# Patient Record
Sex: MALE | Race: OTHER
[De-identification: names, ages, dates, MRNs, and addresses within clinical notes are randomized per-mention and may not be internally consistent; named-entity substitution may affect disease eponyms.]

---

## 2019-09-27 ENCOUNTER — HOSPITAL ENCOUNTER (EMERGENCY)
Dept: HOSPITAL 61 - ER | Age: 49
Discharge: HOME | End: 2019-09-27
Payer: SELF-PAY

## 2019-09-27 VITALS — DIASTOLIC BLOOD PRESSURE: 90 MMHG | SYSTOLIC BLOOD PRESSURE: 149 MMHG

## 2019-09-27 VITALS — HEIGHT: 67 IN | WEIGHT: 200 LBS | BODY MASS INDEX: 31.39 KG/M2
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
## 2019-09-27 DIAGNOSIS — S60.221A: Primary | ICD-10-CM
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
## 2019-09-27 PROCEDURE — 90715 TDAP VACCINE 7 YRS/> IM: CPT

## 2019-09-27 PROCEDURE — 99284 EMERGENCY DEPT VISIT MOD MDM: CPT

## 2019-09-27 PROCEDURE — 90471 IMMUNIZATION ADMIN: CPT

## 2019-09-27 PROCEDURE — 73130 X-RAY EXAM OF HAND: CPT

## 2019-09-27 NOTE — PHYS DOC
Past Medical History


Past Medical History:  No Pertinent History


Past Surgical History:  No Surgical History


Alcohol Use:  None


Drug Use:  None





Adult General


Chief Complaint


Chief Complaint:  PUNCTURE WOUND





[facility]


HPI





Patient is a 48  year old right handed male who presents with complaining of 

injury to right hand. Patient states he was hit with a piece of wood that had an

old  nail and had a puncture wound to dorsal and proximal part of his hand both 

5 hours ago with increasing pain and edema. Patient rated his pain 10 over 10. 

Patient denies fever and chills, focal neuro deficit, other injuries. Last 

tetanus immunization is unknown.





Review of Systems


Review of Systems





Constitutional: Denies fever or chills []


Eyes: Denies change in visual acuity, redness, or eye pain []


HENT: Denies nasal congestion or sore throat []


Respiratory: Denies cough or shortness of breath []


Cardiovascular: No additional information not addressed in HPI []


GI: Denies abdominal pain, nausea, vomiting, bloody stools or diarrhea []


: Denies dysuria or hematuria []


Musculoskeletal: Denies back pain, reports joint pain []


Integument: Denies rash or skin lesions []


Neurologic: Denies headache, focal weakness or sensory changes []


Endocrine: Denies polyuria or polydipsia []





All other systems were reviewed and found to be within normal limits, except as 

documented in this note.





Current Medications


Current Medications





Current Medications








 Medications


  (Trade)  Dose


 Ordered  Sig/[facility]  Start Time


 Stop Time Status Last Admin


Dose Admin


 


 Acetaminophen/


 Hydrocodone Bitart


  (Lortab 5/325)  1 tab  1X  ONCE  19 22:00


 19 22:01 DC 19 21:58


1 TAB


 


 Ciprofloxacin


  (Cipro)  250 mg  1X  ONCE  19 22:00


 19 22:01 DC 19 21:56


250 MG


 


 Diphtheria/


 Tetanus/[facility]


 Pertussis


  (Boostrix)  0.5 ml  ONCE ONCE  19 22:00


 19 22:01 DC 19 22:09


0.5 ML











Allergies


Allergies





Allergies








Coded Allergies Type Severity Reaction Last Updated Verified


 


  No Known Drug Allergies    18 No











Physical Exam


Physical Exam








Constitutional: Well developed, well nourished, mild distress, non-toxic 

appearance. []


HENT: Normocephalic, atraumatic.


Eyes: PERRLA, EOMI, conjunctiva normal, no discharge. [] 


Neck: Normal range of motion, no tenderness, supple, no stridor. [] 


Cardiovascular:Heart rate regular rhythm, no murmur []


Lungs & Thorax:  Bilateral breath sounds clear to auscultation []


Extremities: Right hand with a puncture wound in proximal of fourth metacarpal 

with moderate edema and tenderness without sign of infection, painful range of 

motion.


Neurologic: Alert and oriented X 3, no focal deficits noted. []


Psychologic: Affect anxious, judgement normal, mood normal. []





Current Patient Data


Vital Signs





                                   Vital Signs








  Date Time  Temp Pulse Resp B/P (MAP) Pulse Ox O2 Delivery O2 Flow Rate FiO2


 


19 21:58   20  98 Room Air  


 


19 21:05 98.4 92  149/90 (109)    





 98.4       











EKG


EKG


[]





Radiology/Procedures


Radiology/Procedures


[][facility]


                    [address] [place] [address]


                                 [phone]


                                        


                                 IMAGING REPORT





                                     Signed





PATIENT: LLOYD MCCRACKEN  ACCOUNT: [id number]     MRN#: [id number]


: 1970           LOCATION: ER              AGE: 48


SEX: M                    EXAM DT: 19         ACCESSION#: [id number]


STATUS: REG ER            ORD. PHYSICIAN: HODAN TIRADO MD


REASON: puncture wound with a nail


PROCEDURE: HAND RIGHT 3V





Indication:Puncture wound with nail.


 


TECHNIQUE: 3 views of right hand


 


COMPARISON: None


 


FINDINGS/


impression: No acute fracture or dislocation. No radiopaque foreign body.


 


Electronically signed by: Ilya Hunt DO (2019 9:40 PM) [facility]














DICTATED and SIGNED BY:     ILYA HUNT DO


DATE:     19





Course & Med Decision Making


Course & Med Decision Making


Pertinent  Imaging studies reviewed. (See chart for details)











I've spoken with the patient and/or caregivers. I've explained the patient's 

condition, diagnosis and treatment plan based on information available to me at 

this time. I've answered the patient's and/or caregivers questions and addressed

 any concerns. The patient and/or caregivers have a good understanding the 

patient's diagnosis, condition and treatment plan as can be expected at this 

point. Vital signs have been stabilized. The patient's condition is stable for 

discharge from the emergency department.





The patient will pursue further outpatient evaluation with her primary care 

provider or other designated consulting physician as outlined in the discharge 

instructions. Patient and/or caregivers are agreeable to this plan of care and 

follow-up instructions have been explained in detail. The patient and/or 

caregivers have received these instructions in written format and expressed 

understanding of these discharge instructions. The patient and her caregivers 

are aware that if any significant change in condition or worsening of symptoms 

should prompt him to immediately return to this of the closest emergency de

partment.  If an emergent department is not readily available I would encourage 

him to call [phone].





Dragon Disclaimer


Dragon Disclaimer


This electronic medical record was generated, in whole or in part, using a voice

 recognition dictation system.





Departure


Departure


Impression:  


   Primary Impression:  


   Puncture wound of right hand


   Additional Impression:  


   Contusion of hand


Disposition:   HOME, SELF-CARE (at 2256)


Condition:  IMPROVED


Referrals:  


NO PCP (PCP)


Patient Instructions:  Hand Contusion, Puncture Wound





Additional Instructions:  


Apply ice on your hand


Follow-up with your primary care physician in 3-5 days


Return to ER if not getting better


Scripts


Hydrocodone/Apap 5-325 (NORCO 5-325 TABLET) 1 Each Tablet


1 TAB PO PRN Q6HRS PRN for PAIN, #10 TAB 0 Refills


   Prov: HODAN TIRADO MD         19 


Ciprofloxacin Hcl (CIPRO) 250 Mg Tablet


1 TAB PO BID for infection, #14 TAB


   Prov: HODAN TIRADO MD         19





Problem Qualifiers








   Primary Impression:  


   Puncture wound of right hand


   Encounter type:  initial encounter  Foreign body presence:  without foreign 

   body  Qualified Codes:  S61.431A - Puncture wound without foreign body of 

   right hand, initial encounter


   Additional Impression:  


   Contusion of hand


   Encounter type:  subsequent encounter  Laterality:  right  Qualified Codes:  

   S60.221D - Contusion of right hand, subsequent encounter








HODAN TIRADO MD             Sep 27, 2019 21:27

## 2019-09-27 NOTE — RAD
Indication:Puncture wound with nail.

 

TECHNIQUE: 3 views of right hand

 

COMPARISON: None

 

FINDINGS/

impression: No acute fracture or dislocation. No radiopaque foreign body.

 

Electronically signed by: Ilya Israel DO (9/27/2019 9:40 PM) [facility]

## 2019-09-29 ENCOUNTER — HOSPITAL ENCOUNTER (INPATIENT)
Dept: HOSPITAL 61 - ER | Age: 49
LOS: 4 days | Discharge: HOME | DRG: 982 | End: 2019-10-03
Attending: INTERNAL MEDICINE | Admitting: INTERNAL MEDICINE
Payer: SELF-PAY

## 2019-09-29 VITALS — DIASTOLIC BLOOD PRESSURE: 56 MMHG | SYSTOLIC BLOOD PRESSURE: 97 MMHG

## 2019-09-29 VITALS — SYSTOLIC BLOOD PRESSURE: 118 MMHG | DIASTOLIC BLOOD PRESSURE: 75 MMHG

## 2019-09-29 VITALS — BODY MASS INDEX: 31.13 KG/M2 | WEIGHT: 198.37 LBS | HEIGHT: 67 IN

## 2019-09-29 VITALS — SYSTOLIC BLOOD PRESSURE: 118 MMHG | DIASTOLIC BLOOD PRESSURE: 65 MMHG

## 2019-09-29 VITALS — DIASTOLIC BLOOD PRESSURE: 63 MMHG | SYSTOLIC BLOOD PRESSURE: 99 MMHG

## 2019-09-29 VITALS — DIASTOLIC BLOOD PRESSURE: 74 MMHG | SYSTOLIC BLOOD PRESSURE: 118 MMHG

## 2019-09-29 VITALS — SYSTOLIC BLOOD PRESSURE: 119 MMHG | DIASTOLIC BLOOD PRESSURE: 76 MMHG

## 2019-09-29 VITALS — SYSTOLIC BLOOD PRESSURE: 124 MMHG | DIASTOLIC BLOOD PRESSURE: 74 MMHG

## 2019-09-29 VITALS — DIASTOLIC BLOOD PRESSURE: 76 MMHG | SYSTOLIC BLOOD PRESSURE: 122 MMHG

## 2019-09-29 VITALS — DIASTOLIC BLOOD PRESSURE: 71 MMHG | SYSTOLIC BLOOD PRESSURE: 119 MMHG

## 2019-09-29 DIAGNOSIS — S61.531A: Primary | ICD-10-CM

## 2019-09-29 DIAGNOSIS — L02.511: ICD-10-CM

## 2019-09-29 DIAGNOSIS — R73.03: ICD-10-CM
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
## 2019-09-29 DIAGNOSIS — E66.9: ICD-10-CM

## 2019-09-29 DIAGNOSIS — M00.9: ICD-10-CM
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
## 2019-09-29 DIAGNOSIS — D64.9: ICD-10-CM
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
## 2019-09-29 DIAGNOSIS — Z83.3: ICD-10-CM

## 2019-09-29 DIAGNOSIS — L03.113: ICD-10-CM

## 2019-09-29 DIAGNOSIS — E78.5: ICD-10-CM
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
## 2019-09-29 LAB
ALBUMIN SERPL-MCNC: 3.9 G/DL (ref 3.4–5)
ALBUMIN/GLOB SERPL: 0.9 {RATIO} (ref 1–1.7)
ALP SERPL-CCNC: 51 U/L (ref 46–116)
ALT SERPL-CCNC: 53 U/L (ref 16–63)
ANION GAP SERPL CALC-SCNC: 3 MMOL/L (ref 6–14)
AST SERPL-CCNC: 27 U/L (ref 15–37)
BASOPHILS # BLD AUTO: 0 X10^3/UL (ref 0–0.2)
BASOPHILS NFR BLD: 1 % (ref 0–3)
BF MON %: 24 %
BF OTHER %: 6 %
BF PMN %: 70 %
BF RBC COUNT: (no result) /CMM
BF SOURCE: (no result)
BF WBC COUNT: 6175 /CMM
BILIRUB SERPL-MCNC: 0.7 MG/DL (ref 0.2–1)
BUN SERPL-MCNC: 15 MG/DL (ref 8–26)
BUN/CREAT SERPL: 14 (ref 6–20)
CALCIUM SERPL-MCNC: 9.6 MG/DL (ref 8.5–10.1)
CHLORIDE SERPL-SCNC: 105 MMOL/L (ref 98–107)
CLARITY SPEC: (no result)
CO2 SERPL-SCNC: 31 MMOL/L (ref 21–32)
COLOR FLD: (no result)
CREAT SERPL-MCNC: 1.1 MG/DL (ref 0.7–1.3)
EOSINOPHIL NFR BLD: 0 % (ref 0–3)
EOSINOPHIL NFR BLD: 0 X10^3/UL (ref 0–0.7)
ERYTHROCYTE [DISTWIDTH] IN BLOOD BY AUTOMATED COUNT: 13.5 % (ref 11.5–14.5)
GFR SERPLBLD BASED ON 1.73 SQ M-ARVRAT: 72.1 ML/MIN
GLOBULIN SER-MCNC: 4.2 G/DL (ref 2.2–3.8)
GLUCOSE SERPL-MCNC: 104 MG/DL (ref 70–99)
HCT VFR BLD CALC: 41.5 % (ref 39–53)
HGB BLD-MCNC: 14.4 G/DL (ref 13–17.5)
LYMPHOCYTES # BLD: 2.1 X10^3/UL (ref 1–4.8)
LYMPHOCYTES NFR BLD AUTO: 21 % (ref 24–48)
MCH RBC QN AUTO: 31 PG (ref 25–35)
MCHC RBC AUTO-ENTMCNC: 35 G/DL (ref 31–37)
MCV RBC AUTO: 88 FL (ref 79–100)
MONO #: 1 X10^3/UL (ref 0–1.1)
MONOCYTES NFR BLD: 10 % (ref 0–9)
NEUT #: 6.7 X10^3/UL (ref 1.8–7.7)
NEUTROPHILS NFR BLD AUTO: 68 % (ref 31–73)
PLATELET # BLD AUTO: 231 X10^3/UL (ref 140–400)
POTASSIUM SERPL-SCNC: 4 MMOL/L (ref 3.5–5.1)
PROT SERPL-MCNC: 8.1 G/DL (ref 6.4–8.2)
RBC # BLD AUTO: 4.73 X10^6/UL (ref 4.3–5.7)
SODIUM SERPL-SCNC: 139 MMOL/L (ref 136–145)
WBC # BLD AUTO: 9.8 X10^3/UL (ref 4–11)

## 2019-09-29 PROCEDURE — 96374 THER/PROPH/DIAG INJ IV PUSH: CPT

## 2019-09-29 PROCEDURE — 80202 ASSAY OF VANCOMYCIN: CPT

## 2019-09-29 PROCEDURE — 85025 COMPLETE CBC W/AUTO DIFF WBC: CPT

## 2019-09-29 PROCEDURE — 82565 ASSAY OF CREATININE: CPT

## 2019-09-29 PROCEDURE — A7015 AEROSOL MASK USED W NEBULIZE: HCPCS

## 2019-09-29 PROCEDURE — G0378 HOSPITAL OBSERVATION PER HR: HCPCS

## 2019-09-29 PROCEDURE — 89050 BODY FLUID CELL COUNT: CPT

## 2019-09-29 PROCEDURE — 87040 BLOOD CULTURE FOR BACTERIA: CPT

## 2019-09-29 PROCEDURE — 85027 COMPLETE CBC AUTOMATED: CPT

## 2019-09-29 PROCEDURE — 80048 BASIC METABOLIC PNL TOTAL CA: CPT

## 2019-09-29 PROCEDURE — 96376 TX/PRO/DX INJ SAME DRUG ADON: CPT

## 2019-09-29 PROCEDURE — 87071 CULTURE AEROBIC QUANT OTHER: CPT

## 2019-09-29 PROCEDURE — 36415 COLL VENOUS BLD VENIPUNCTURE: CPT

## 2019-09-29 PROCEDURE — 96365 THER/PROPH/DIAG IV INF INIT: CPT

## 2019-09-29 PROCEDURE — 51702 INSERT TEMP BLADDER CATH: CPT

## 2019-09-29 PROCEDURE — 85651 RBC SED RATE NONAUTOMATED: CPT

## 2019-09-29 PROCEDURE — 0MD70ZZ EXTRACTION OF RIGHT HAND BURSA AND LIGAMENT, OPEN APPROACH: ICD-10-PCS | Performed by: ORTHOPAEDIC SURGERY

## 2019-09-29 PROCEDURE — 87075 CULTR BACTERIA EXCEPT BLOOD: CPT

## 2019-09-29 PROCEDURE — 96375 TX/PRO/DX INJ NEW DRUG ADDON: CPT

## 2019-09-29 PROCEDURE — 85610 PROTHROMBIN TIME: CPT

## 2019-09-29 PROCEDURE — 96366 THER/PROPH/DIAG IV INF ADDON: CPT

## 2019-09-29 PROCEDURE — 80053 COMPREHEN METABOLIC PANEL: CPT

## 2019-09-29 PROCEDURE — 0R9N3ZZ DRAINAGE OF RIGHT WRIST JOINT, PERCUTANEOUS APPROACH: ICD-10-PCS | Performed by: ORTHOPAEDIC SURGERY

## 2019-09-29 RX ADMIN — PIPERACILLIN SODIUM AND TAZOBACTAM SODIUM SCH MLS/HR: 3; .375 INJECTION, POWDER, LYOPHILIZED, FOR SOLUTION INTRAVENOUS at 19:15

## 2019-09-29 RX ADMIN — VANCOMYCIN HYDROCHLORIDE PRN EACH: 1 INJECTION, POWDER, LYOPHILIZED, FOR SOLUTION INTRAVENOUS at 14:29

## 2019-09-29 RX ADMIN — FENTANYL CITRATE PRN MCG: 50 INJECTION INTRAMUSCULAR; INTRAVENOUS at 07:56

## 2019-09-29 RX ADMIN — FENTANYL CITRATE PRN MCG: 50 INJECTION INTRAMUSCULAR; INTRAVENOUS at 08:28

## 2019-09-29 RX ADMIN — HYDROCODONE BITARTRATE AND ACETAMINOPHEN PRN TAB: 5; 325 TABLET ORAL at 14:02

## 2019-09-29 RX ADMIN — VANCOMYCIN HYDROCHLORIDE SCH MLS/HR: 1 INJECTION, POWDER, FOR SOLUTION INTRAVENOUS at 23:07

## 2019-09-29 RX ADMIN — PIPERACILLIN SODIUM AND TAZOBACTAM SODIUM SCH MLS/HR: 3; .375 INJECTION, POWDER, LYOPHILIZED, FOR SOLUTION INTRAVENOUS at 14:02

## 2019-09-29 RX ADMIN — FENTANYL CITRATE PRN MCG: 50 INJECTION INTRAMUSCULAR; INTRAVENOUS at 10:15

## 2019-09-29 NOTE — CONS
DATE OF CONSULTATION:  09/29/2019



REFERRING PHYSICIAN:  Shabnam Archer MD



REASON FOR CONSULTATION:  Severe cellulitis.



HISTORY OF PRESENT ILLNESS:  This patient is a 48-year-old  male who on

09/27 was working at home when a wooden board, with a nail sticking out, fell,

puncturing his right hand.  He says he wrapped his hand to stop the bleeding.  A

few hours later, he arrived to the ER with complaints of pain and swelling. 

X-ray at the time showed no acute fracture, dislocation or radiopaque foreign

body.  He was given a tetanus shot, prescription for Cipro and released home. 

Over the following 2 days, he says his hand became increasingly painful, swollen

with limited range of motion despite antibiotics.  He is now on vancomycin.  He

was evaluated by Ortho with plans for surgery soon.



PAST MEDICAL HISTORY:  Hyperlipidemia and prediabetes.



PAST SURGICAL HISTORY:  Denies previous surgeries.



FAMILY HISTORY:  Diabetes.



SOCIAL HISTORY:  He is .  He works as a .  He does not

smoke.



ALLERGIES:  No known drug allergies.



MEDICATIONS:  Vancomycin, clonidine, fentanyl, Lortab, Dilaudid, morphine,

ondansetron, prochlorperazine, and Tylenol.  Previously on outpatient

ciprofloxacin.



REVIEW OF SYSTEMS:  The patient denies fevers, chills, sweats or body aches. 

Denies shortness of air, cough or chest discomfort.  Denies nausea, vomiting or

diarrhea.  Denies rash.  Other review of systems negative.



PHYSICAL EXAMINATION:

VITAL SIGNS:  Temperature is 97.9, blood pressure 138/87, heart rate 85,

respiratory rate 16, and pulse oximetry 98% on room air.  BMI is 31.

GENERAL:  The patient is sitting in a chair, alert and well appearing.

HEENT:  Pupils are equally round and reactive.  Oropharynx pink and moist.

NECK:  Supple.

LUNGS:  Clear to auscultation.

CARDIAC:  S1 and S2.

ABDOMEN:  Soft and nontender.

EXTREMITIES:  Unremarkable except right hand and fingers are edematous and warm

with limited .  He has a puncture tunde dorsal aspect of the wrist area. 

Radial pulse is strong and capillary reflex brisk.

SKIN:  Warm to touch.  No signs of rash.

NEUROLOGIC:  Alert, answering questions appropriately.



LABORATORY DATA:  Today's WBC 9.8, hemoglobin 14.4, and platelets 231,000.  Sed

rate 23.  Creatinine 1.1 and BUN 15.  Electrolytes are unremarkable.  Glucose

104.  Total bilirubin 0.7, AST 27, and ALT 53.  X-ray of the hand per HPI.



IMPRESSION:

1.  Cellulitis of right hand.

2.  Puncture wound, right hand, with nail.

3.  Prediabetes.

4.  Hyperlipidemia.



PLAN:  The patient already was given a tetanus shot on the 27th.  Continue the

vancomycin and add Zosyn.  Monitor renal function closely.  He was seen by Ortho

with plans for surgery today.  Cultures requested.  Discussed with wife at

bedside.



Thank you Dr. Archer for asking us to participate in this patient's care. 

Should you have further questions or concerns, please call.

 



______________________________

YEVGENIY DALLAS MD



DR:  BOOM/nts  JOB#:  [id number] / [id number]

DD:  09/29/2019 11:13  DT:  09/29/2019 15:46

## 2019-09-29 NOTE — CONS
DATE OF CONSULTATION:  09/29/2019



REASON FOR CONSULTATION:  Right wrist pain and swelling.



REQUESTING PHYSICIAN:  Dr. Rocha and Dr. Archer.



HISTORY OF PRESENT ILLNESS:  The patient is a 48-year-old male who speaks

reasonable English and complains of right wrist pain and swelling, particularly

over the back of his hand.  After he had an injury from a nail several days ago

that poked into the dorsal aspect of his hand over the roughly over the wrist

joint.  He said it initially bled quite a bit and he placed a dressing over it

and wrapped that, he came to the Emergency Department later apparently was

provided a tetanus and some antibiotics and pain medication, but he notes that

despite taking antibiotics swelling and pain have worsened and it is more red,

warm and very painful, difficult to move his wrist and fully flex his fingers

due to the swelling.



PAST MEDICAL HISTORY/SURGICAL HISTORY:  He denies any past medical history or

surgical history.



SOCIAL HISTORY:  No tobacco, alcohol or drug use.  He is accompanied by his

family.  He has been taking Tylenol for pain.



ALLERGIES:  He has no known drug allergies.



REVIEW OF SYSTEMS:  Denies any fever, chills, chest pain or shortness of breath.

 No additional joint pain aside from the hand and wrist swelling on the right

and tightness where he can flex the hand very well.  Denies any other skin

problems, joint pain, or other constitutional symptoms.  No chest pain,

shortness of breath, respiratory or urinary tract symptoms.



PHYSICAL EXAMINATION:

GENERAL:  Pleasant, cooperative male.

VITAL SIGNS:  Temperature 97.9, pulse 83, respirations 16, blood pressure 153/98

and 98% saturation on room air.

EXTREMITIES:  On examination of the right hand, he has significant swelling over

the dorsum of the hand.  No specific point tenderness over the extensor

mechanism, although his wrist does have an effusion and is very tense.  He

cannot flex his fingers fully.  Flexor profundus superficialis function;

however, is intact and he has no tenderness on palpation over the flexor tendon

sheaths.



LABORATORY DATA:  White count is 9.8.



IMAGING DATA:  X-rays show no bony abnormality.  He has normal examination

contralateral hand and wrist, bilateral shoulder and elbow as well.



IMPRESSION:  Right wrist puncture wound and possible septic arthritis, right

wrist.



TREATMENT PLAN:  I went over with him my rationale for aspirating the wrist. 

His wrist was aspirated for about 3-4 mL of very cloudy appearing blood-tinged

fluid.  This was sent off for cell count, aerobic and anaerobic cultures and

Gram stain.  I told him that based on his symptoms.  He did not really have an

infected wrist joint and I recommend surgical treatment of that because of the

rapid damage that can occur with joint involvement.  All his questions were

answered and he does wish to proceed with surgical evaluation and treatment,

which will occur urgently today as he has had nothing to eat or drink since last

night.

 



______________________________

SHATNI DUMAS MD



DR:  YURIDIA/oscar  JOB#:  [id number] / [id number]

DD:  09/29/2019 11:24  DT:  09/29/2019 15:32

## 2019-09-29 NOTE — PDOC1
History and Physical


Date of Admission


Date of Admission


DATE: 9/29/19 


TIME: 09:34





Identification/Chief Complaint


Chief Complaint


PUNCTURED WOUND BY NAIL rt haND 2 DAYS AGO





Source


Source:  Caregiver, Chart review, Patient





History of Present Illness


History of Present Illness


48  male, accompanied by wife, he understands and speaks a little 

english, works in construction, punctured his rt hand with a nail last 

friday,went to our ER, XRAY was ok and sent home on PO cipto and pain med to 

come back today, MARKEDLY SWOLLEN RT HAND to RT ARM, afebrile though, cant make 

a fist, no leukocytosis,  esr i ordered but still pending,. Admitted for failed 

OP abx,


NON DM, does not take any home meds pTA. NOn smoker, nonm drinker, no prev sxs. 

Got vanc at ER





Past Medical History


Cardiovascular:  No pertinent hx


Pulmonary:  No pertinent hx


GI:  No pertinent hx


Heme/Onc:  No pertinent hx


Hepatobiliary:  No pertinent hx


Psych:  No pertinent hx


Rheumatologic:  No pertinent hx


Infectious disease:  No pertinent hx


ENT:  No pertinent hx


Endocrine:  No pertinent hx


Dermatology:  No pertinent hx





Past Surgical History


Past Surgical History:  No pertinent history





Family History


Family History:  No Significant





Social History


Smoke:  No


ALCOHOL:  none


Drugs:  None





Current Problem List


Problem List


Problems


Medical Problems:


(1) Cellulitis of right hand


Status: Acute  











Current Medications


Current Medications





Current Medications


Fentanyl Citrate (Fentanyl 2ml Vial) 25 mcg PRN Q15MIN  PRN IV PAIN GREATER THAN

3/10 Last administered on 9/29/19at 08:28;  Start 9/29/19 at 07:15;  Stop 

9/30/19 at 07:14


Vancomycin HCl 250 ml @  250 mls/hr 1X  ONCE IV  Last administered on 9/29/19at 

08:00;  Start 9/29/19 at 07:15;  Stop 9/29/19 at 08:14;  Status DC


Acetaminophen/ Hydrocodone Bitart (Lortab 5/325) 1 tab PRN Q6HRS  PRN PO PAIN;  

Start 9/29/19 at 08:45


Morphine Sulfate (Morphine Sulfate) 2 mg PRN Q2HR  PRN IV PAIN;  Start 9/29/19 

at 08:45


Acetaminophen (Tylenol) 500 mg PRN Q6HRS  PRN PO MILD PAIN / TEMP;  Start 

9/29/19 at 08:45


Ondansetron HCl (Zofran) 4 mg PRN Q6HRS  PRN IVP NAUSEA/VOMITING;  Start 9/29/19

at 08:45


Vancomycin HCl (Vanco Per Pharmacy) 1 each PRN DAILY  PRN MC SEE COMMENTS;  

Start 9/29/19 at 08:45;  Status UNV


Clonidine HCl (Catapres) 0.1 mg PRN Q1HR  PRN PO HYPERTENSION;  Start 9/29/19 at

08:45


Vancomycin HCl 250 ml @  250 mls/hr 1X  ONCE IV ;  Start 9/29/19 at 09:00;  Stop

9/29/19 at 09:59


Ondansetron HCl (Zofran) 4 mg PRN Q8HRS  PRN IV NAUSEA/VOMITING;  Start 9/29/19 

at 09:00;  Stop 9/30/19 at 08:59


Morphine Sulfate (Morphine Sulfate) 4 mg PRN Q2HR  PRN IV PAIN;  Start 9/29/19 

at 09:00;  Stop 9/30/19 at 08:59


Acetaminophen (Tylenol) 650 mg PRN Q4HRS  PRN PO FEVER;  Start 9/29/19 at 09:00;

 Stop 9/30/19 at 08:59





Active Scripts


Active


Norco 5-325 Tablet (Acetaminophen/Hydrocodone Bitart) 1 Each Tablet 1 Tab PO PRN

Q6HRS PRN


Cipro (Ciprofloxacin Hcl) 250 Mg Tablet 1 Tab PO BID


Azithromycin Tablet (Azithromycin) 250 Mg Tablet 1 Pkg PO UD





Allergies


Allergies:  


Coded Allergies:  


     No Known Drug Allergies (Unverified , 12/28/18)





ROS


Review of System


as per HPI, the rest 14 pt neg





Physical Exam


General:  Alert, Oriented X3, Cooperative, No acute distress


HEENT:  Atraumatic, PERRLA


Lungs:  Clear to auscultation, Normal air movement


Heart:  S1S2, RRR, no thrills, no rubs


Cardiovascular:  S1, S2


Breasts:  Normal, Rt breast nml w/o mass, Lt breast nml w/o mass, Nipples normal


Abdomen:  Normal bowel sounds, Soft, No tenderness, No hepatosplenomegaly, No 

masses


Male Genitals Exam:  normal genitalia, normal prostate


PELVIC:  Nml ext genitalia


Extremities:  Other (RT hand markedly swoelln, cant nake a fist, tenderness and 

warm up to rt forarm near elbow, punctired dorsal site hand wound visible, no 

bleeding today)


Neuro:  Normal gait, Normal speech, Strength at 5/5 X4 ext, Normal tone, 

Sensation intact, Cranial nerves 3-12 NL, Reflexes 2+


Psych/Mental Status:  Mental status NL, Mood NL





Vitals


Vitals





Vital Signs








  Date Time  Temp Pulse Resp B/P (MAP) Pulse Ox O2 Delivery O2 Flow Rate FiO2


 


9/29/19 08:28   16     


 


9/29/19 07:05 97.9 83  153/98 (116) 98 Room Air  





 97.9       











Labs


Labs





Laboratory Tests








Test


 9/29/19


07:37


 


White Blood Count


 9.8 x10^3/uL


(4.0-11.0)


 


Red Blood Count


 4.73 x10^6/uL


(4.30-5.70)


 


Hemoglobin


 14.4 g/dL


(13.0-17.5)


 


Hematocrit


 41.5 %


(39.0-53.0)


 


Mean Corpuscular Volume 88 fL () 


 


Mean Corpuscular Hemoglobin 31 pg (25-35) 


 


Mean Corpuscular Hemoglobin


Concent 35 g/dL


(31-37)


 


Red Cell Distribution Width


 13.5 %


(11.5-14.5)


 


Platelet Count


 231 x10^3/uL


(140-400)


 


Neutrophils (%) (Auto) 68 % (31-73) 


 


Lymphocytes (%) (Auto) 21 % (24-48) 


 


Monocytes (%) (Auto) 10 % (0-9) 


 


Eosinophils (%) (Auto) 0 % (0-3) 


 


Basophils (%) (Auto) 1 % (0-3) 


 


Neutrophils # (Auto)


 6.7 x10^3/uL


(1.8-7.7)


 


Lymphocytes # (Auto)


 2.1 x10^3/uL


(1.0-4.8)


 


Monocytes # (Auto)


 1.0 x10^3/uL


(0.0-1.1)


 


Eosinophils # (Auto)


 0.0 x10^3/uL


(0.0-0.7)


 


Basophils # (Auto)


 0.0 x10^3/uL


(0.0-0.2)


 


Sodium Level


 139 mmol/L


(136-145)


 


Potassium Level


 4.0 mmol/L


(3.5-5.1)


 


Chloride Level


 105 mmol/L


()


 


Carbon Dioxide Level


 31 mmol/L


(21-32)


 


Anion Gap 3 (6-14) 


 


Blood Urea Nitrogen


 15 mg/dL


(8-26)


 


Creatinine


 1.1 mg/dL


(0.7-1.3)


 


Estimated GFR


(Cockcroft-Gault) 72.1 





 


BUN/Creatinine Ratio 14 (6-20) 


 


Glucose Level


 104 mg/dL


(70-99)


 


Calcium Level


 9.6 mg/dL


(8.5-10.1)


 


Total Bilirubin


 0.7 mg/dL


(0.2-1.0)


 


Aspartate Amino Transf


(AST/SGOT) 27 U/L (15-37) 





 


Alanine Aminotransferase


(ALT/SGPT) 53 U/L (16-63) 





 


Alkaline Phosphatase


 51 U/L


()


 


Total Protein


 8.1 g/dL


(6.4-8.2)


 


Albumin


 3.9 g/dL


(3.4-5.0)


 


Albumin/Globulin Ratio 0.9 (1.0-1.7) 








Laboratory Tests








Test


 9/29/19


07:37


 


White Blood Count


 9.8 x10^3/uL


(4.0-11.0)


 


Red Blood Count


 4.73 x10^6/uL


(4.30-5.70)


 


Hemoglobin


 14.4 g/dL


(13.0-17.5)


 


Hematocrit


 41.5 %


(39.0-53.0)


 


Mean Corpuscular Volume 88 fL () 


 


Mean Corpuscular Hemoglobin 31 pg (25-35) 


 


Mean Corpuscular Hemoglobin


Concent 35 g/dL


(31-37)


 


Red Cell Distribution Width


 13.5 %


(11.5-14.5)


 


Platelet Count


 231 x10^3/uL


(140-400)


 


Neutrophils (%) (Auto) 68 % (31-73) 


 


Lymphocytes (%) (Auto) 21 % (24-48) 


 


Monocytes (%) (Auto) 10 % (0-9) 


 


Eosinophils (%) (Auto) 0 % (0-3) 


 


Basophils (%) (Auto) 1 % (0-3) 


 


Neutrophils # (Auto)


 6.7 x10^3/uL


(1.8-7.7)


 


Lymphocytes # (Auto)


 2.1 x10^3/uL


(1.0-4.8)


 


Monocytes # (Auto)


 1.0 x10^3/uL


(0.0-1.1)


 


Eosinophils # (Auto)


 0.0 x10^3/uL


(0.0-0.7)


 


Basophils # (Auto)


 0.0 x10^3/uL


(0.0-0.2)


 


Sodium Level


 139 mmol/L


(136-145)


 


Potassium Level


 4.0 mmol/L


(3.5-5.1)


 


Chloride Level


 105 mmol/L


()


 


Carbon Dioxide Level


 31 mmol/L


(21-32)


 


Anion Gap 3 (6-14) 


 


Blood Urea Nitrogen


 15 mg/dL


(8-26)


 


Creatinine


 1.1 mg/dL


(0.7-1.3)


 


Estimated GFR


(Cockcroft-Gault) 72.1 





 


BUN/Creatinine Ratio 14 (6-20) 


 


Glucose Level


 104 mg/dL


(70-99)


 


Calcium Level


 9.6 mg/dL


(8.5-10.1)


 


Total Bilirubin


 0.7 mg/dL


(0.2-1.0)


 


Aspartate Amino Transf


(AST/SGOT) 27 U/L (15-37) 





 


Alanine Aminotransferase


(ALT/SGPT) 53 U/L (16-63) 





 


Alkaline Phosphatase


 51 U/L


()


 


Total Protein


 8.1 g/dL


(6.4-8.2)


 


Albumin


 3.9 g/dL


(3.4-5.0)


 


Albumin/Globulin Ratio 0.9 (1.0-1.7) 











VTE Prophylaxis Ordered


VTE Prophylaxis Devices:  Yes


VTE Pharmacological Prophylaxi:  Yes





Assessment/Plan


Assessment/Plan


PUNCTURED wound by nail - 9/27 - got TT, failed oP cipro


NON DM


Cellulitis, MARKED RT arm up to RT forearm, tight - r/o compartment





PLAN:


NPO till ortho


CHeck CT arm


add esr


NO  home meds tor reconcile


I cont vanc per pharmacy


Consult ID


FULL CODE


Seen at ER


dw wife and pt and DEE Buchanan MD           Sep 29, 2019 09:39

## 2019-09-29 NOTE — PHYS DOC
Past Medical History


Past Medical History:  No Pertinent History


Past Surgical History:  No Surgical History


Alcohol Use:  None


Drug Use:  None





Adult General


Chief Complaint


Chief Complaint:  HAND PROBLEM





HPI


HPI





Jose is a 48-year-old male who presents with complaint of right hand pain and 

swelling after an nail pierced the dorsal aspect of his hand. He states that it 

had been bleeding quite a bit with the initial injury and he placed a dressing 

over it and wrapped it. He states that he then came here and was seen by a 

provider and was prescribed antibiotics and pain medication. He states that 

despite taking the antibiotics, swelling and pain have worsened as well as 

redness and warmth to the area. Patient rates the pain as moderate and states 

that pain is worsened with flexion of his fingers. He states that pain is 

primarily in the dorsal aspect of his hand.[]





Review of Systems


Review of Systems





Constitutional: Denies fever or chills []


Respiratory: Denies cough or shortness of breath []


Cardiovascular: No additional information not addressed in HPI []


Musculoskeletal: Positive right hand pain []


Integument: Denies rash or skin lesions []





All other systems were reviewed and found to be within normal limits, except as 

documented in this note.





Current Medications


Current Medications





Current Medications








 Medications


  (Trade)  Dose


 Ordered  Sig/Alton  Start Time


 Stop Time Status Last Admin


Dose Admin


 


 Acetaminophen


  (Tylenol)  500 mg  PRN Q6HRS  PRN  9/29/19 08:45


     





 


 Acetaminophen/


 Hydrocodone Bitart


  (Lortab 5/325)  1 tab  PRN Q6HRS  PRN  9/29/19 08:45


     





 


 Clonidine HCl


  (Catapres)  0.1 mg  PRN Q1HR  PRN  9/29/19 08:45


     





 


 Fentanyl Citrate


  (Fentanyl 2ml


 Vial)  25 mcg  PRN Q15MIN  PRN  9/29/19 07:15


 9/30/19 07:14  9/29/19 08:28


25 MCG


 


 Morphine Sulfate


  (Morphine


 Sulfate)  2 mg  PRN Q2HR  PRN  9/29/19 08:45


     





 


 Ondansetron HCl


  (Zofran)  4 mg  PRN Q6HRS  PRN  9/29/19 08:45


     





 


 Vancomycin HCl


  (Vanco Per


 Pharmacy)  1 each  PRN DAILY  PRN  9/29/19 08:45


   UNV  














Allergies


Allergies





Allergies








Coded Allergies Type Severity Reaction Last Updated Verified


 


  No Known Drug Allergies    12/28/18 No











Physical Exam


Physical Exam





Constitutional: Well developed, well nourished, no acute distress, non-toxic 

appearance. []


HENT: Normocephalic, atraumatic, bilateral external ears normal, oropharynx 

moist, no oral exudates, nose normal. []


Eyes: PERRLA, EOMI, conjunctiva normal, no discharge. [] 


Neck: Normal range of motion, no tenderness, supple, no stridor. [] 


Cardiovascular: Regular rate and rhythm[]


Lungs & Thorax:  Bilateral breath sounds clear to auscultation []


Abdomen: Bowel sounds normal, soft. [] 


Skin: Warm, dry, no erythema, no rash. [] 


Extremities: Examination of right hand demonstrates moderate soft tissue 

swelling, redness and warmth, primarily to dorsal aspect of the hand with 

diffuse tenderness to palpation. Patient reports pain with flexion of fingers. 

[] 


Neurologic: Alert and oriented X 3, no focal deficits noted. []





Current Patient Data


Vital Signs





                                   Vital Signs








  Date Time  Temp Pulse Resp B/P (MAP) Pulse Ox O2 Delivery O2 Flow Rate FiO2


 


9/29/19 08:28   16     


 


9/29/19 07:05 97.9 83  153/98 (116) 98 Room Air  





 97.9       








Lab Values





                                Laboratory Tests








Test


 9/29/19


07:37


 


White Blood Count


 9.8 x10^3/uL


(4.0-11.0)


 


Red Blood Count


 4.73 x10^6/uL


(4.30-5.70)


 


Hemoglobin


 14.4 g/dL


(13.0-17.5)


 


Hematocrit


 41.5 %


(39.0-53.0)


 


Mean Corpuscular Volume


 88 fL ()





 


Mean Corpuscular Hemoglobin 31 pg (25-35)  


 


Mean Corpuscular Hemoglobin


Concent 35 g/dL


(31-37)


 


Red Cell Distribution Width


 13.5 %


(11.5-14.5)


 


Platelet Count


 231 x10^3/uL


(140-400)


 


Neutrophils (%) (Auto) 68 % (31-73)  


 


Lymphocytes (%) (Auto) 21 % (24-48)  L


 


Monocytes (%) (Auto) 10 % (0-9)  H


 


Eosinophils (%) (Auto) 0 % (0-3)  


 


Basophils (%) (Auto) 1 % (0-3)  


 


Neutrophils # (Auto)


 6.7 x10^3/uL


(1.8-7.7)


 


Lymphocytes # (Auto)


 2.1 x10^3/uL


(1.0-4.8)


 


Monocytes # (Auto)


 1.0 x10^3/uL


(0.0-1.1)


 


Eosinophils # (Auto)


 0.0 x10^3/uL


(0.0-0.7)


 


Basophils # (Auto)


 0.0 x10^3/uL


(0.0-0.2)


 


Sodium Level


 139 mmol/L


(136-145)


 


Potassium Level


 4.0 mmol/L


(3.5-5.1)


 


Chloride Level


 105 mmol/L


()


 


Carbon Dioxide Level


 31 mmol/L


(21-32)


 


Anion Gap 3 (6-14)  L


 


Blood Urea Nitrogen


 15 mg/dL


(8-26)


 


Creatinine


 1.1 mg/dL


(0.7-1.3)


 


Estimated GFR


(Cockcroft-Gault) 72.1  





 


BUN/Creatinine Ratio 14 (6-20)  


 


Glucose Level


 104 mg/dL


(70-99)  H


 


Calcium Level


 9.6 mg/dL


(8.5-10.1)


 


Total Bilirubin


 0.7 mg/dL


(0.2-1.0)


 


Aspartate Amino Transferase


(AST) 27 U/L (15-37)





 


Alanine Aminotransferase (ALT)


 53 U/L (16-63)





 


Alkaline Phosphatase


 51 U/L


()


 


Total Protein


 8.1 g/dL


(6.4-8.2)


 


Albumin


 3.9 g/dL


(3.4-5.0)


 


Albumin/Globulin Ratio


 0.9 (1.0-1.7)


L





                                Laboratory Tests


9/29/19 07:37








                                Laboratory Tests


9/29/19 07:37











EKG


EKG


[]





Radiology/Procedures


Radiology/Procedures


[]





Course & Med Decision Making


Course & Med Decision Making


Pertinent Labs and Imaging studies reviewed. (See chart for details)





[]





Dragon Disclaimer


Dragon Disclaimer


This electronic medical record was generated, in whole or in part, using a voice

 recognition dictation system.





Departure


Departure


Impression:  


   Primary Impression:  


   Cellulitis of right hand


Disposition:  09 ADMITTED AS INPATIENT


Admitting Physician:  NOE (Dr. Archer)


Condition:  GOOD


Referrals:  


NO PCP (PCP)











GERALD BABB Jr. DO          Sep 29, 2019 07:24

## 2019-09-29 NOTE — PDOC
Infectious Disease Note


Vital Sign


Vital Signs





Vital Signs








  Date Time  Temp Pulse Resp B/P (MAP) Pulse Ox O2 Delivery O2 Flow Rate FiO2


 


9/29/19 10:15   16     


 


9/29/19 10:10  85  138/87 (104) 98 Room Air  


 


9/29/19 07:05 97.9       





 97.9       











Labs


Lab





Laboratory Tests








Test


 9/29/19


07:37


 


White Blood Count


 9.8 x10^3/uL


(4.0-11.0)


 


Red Blood Count


 4.73 x10^6/uL


(4.30-5.70)


 


Hemoglobin


 14.4 g/dL


(13.0-17.5)


 


Hematocrit


 41.5 %


(39.0-53.0)


 


Mean Corpuscular Volume 88 fL () 


 


Mean Corpuscular Hemoglobin 31 pg (25-35) 


 


Mean Corpuscular Hemoglobin


Concent 35 g/dL


(31-37)


 


Red Cell Distribution Width


 13.5 %


(11.5-14.5)


 


Platelet Count


 231 x10^3/uL


(140-400)


 


Neutrophils (%) (Auto) 68 % (31-73) 


 


Lymphocytes (%) (Auto) 21 % (24-48) 


 


Monocytes (%) (Auto) 10 % (0-9) 


 


Eosinophils (%) (Auto) 0 % (0-3) 


 


Basophils (%) (Auto) 1 % (0-3) 


 


Neutrophils # (Auto)


 6.7 x10^3/uL


(1.8-7.7)


 


Lymphocytes # (Auto)


 2.1 x10^3/uL


(1.0-4.8)


 


Monocytes # (Auto)


 1.0 x10^3/uL


(0.0-1.1)


 


Eosinophils # (Auto)


 0.0 x10^3/uL


(0.0-0.7)


 


Basophils # (Auto)


 0.0 x10^3/uL


(0.0-0.2)


 


Erythrocyte Sedimentation Rate 23 (0-15) 


 


Sodium Level


 139 mmol/L


(136-145)


 


Potassium Level


 4.0 mmol/L


(3.5-5.1)


 


Chloride Level


 105 mmol/L


()


 


Carbon Dioxide Level


 31 mmol/L


(21-32)


 


Anion Gap 3 (6-14) 


 


Blood Urea Nitrogen


 15 mg/dL


(8-26)


 


Creatinine


 1.1 mg/dL


(0.7-1.3)


 


Estimated GFR


(Cockcroft-Gault) 72.1 





 


BUN/Creatinine Ratio 14 (6-20) 


 


Glucose Level


 104 mg/dL


(70-99)


 


Calcium Level


 9.6 mg/dL


(8.5-10.1)


 


Total Bilirubin


 0.7 mg/dL


(0.2-1.0)


 


Aspartate Amino Transf


(AST/SGOT) 27 U/L (15-37) 





 


Alanine Aminotransferase


(ALT/SGPT) 53 U/L (16-63) 





 


Alkaline Phosphatase


 51 U/L


()


 


Total Protein


 8.1 g/dL


(6.4-8.2)


 


Albumin


 3.9 g/dL


(3.4-5.0)


 


Albumin/Globulin Ratio 0.9 (1.0-1.7) 











Objective


Assessment


Cellulitis of right hand. 


  -Failed OP cipro 


Puncture wound (nail) right hand.


Prediabetes


HLD





Plan


Plan of Care


Tetanus shot 9/27


Continue vancomycin and add Zosyn


Monitor renal function closely 


Seen by ortho w/ plans for surgery


f/u cultures





D/w wife 


D/w nursing





Thank you


[id number]





Patient seen and examined. Chart reviewed in detail. Case discussed with NP. 

Agree with above plan.











MIHAELA WALTER        Sep 29, 2019 11:14


YEVGENIY DALLAS MD             Sep 29, 2019 20:34

## 2019-09-29 NOTE — NUR
Pharmacy Vancomycin Dosing Note



S:Consulted to monitor and dose vancomycin started 09/29/19.



O:LLOYD MCCRACKEN is a 48 year old M with 

Cellulitis SEPTIC ARTHRITIS .



Height: 5 feet, 7 inches

Weight: 90.435784 kg

Ideal Body Weight: 66.10 

Adjusted Body Weight: 75.94 

Dosing Weight: Actual



Other Antibiotics: 

ZOSYN



LABS:

Last BUN: 15 

Last Creatinine: 1.1 

Creatinine Clearance: 90 mL/min

Last WBC: 9.8 

Last Procalcitonin: 

Tmax (past 24 hours): 98.0 



Microbiology: 



I/O: 



Drug Levels:

Last  level:  on  at 

Last dose given 09/29/19 at 1121 



Vancomycin Dosing:

Loading Dose: 2000 mg x1

Dosing Weight: Actual

Target Trough: 15-20





A: Based on: Body weight and renal function





P: 1. After 2gm loading dose, start Vancomycin 1500 mg IV q12h

   2. Follow up Trough level on 09/30/19 at 2300 

   3. Pharmacy will continue to monitor, follow and adjust therapy as needed.

 

 JESSIKA ACOSTA RPH, 09/29/19 1430

## 2019-09-30 VITALS — DIASTOLIC BLOOD PRESSURE: 62 MMHG | SYSTOLIC BLOOD PRESSURE: 122 MMHG

## 2019-09-30 VITALS — SYSTOLIC BLOOD PRESSURE: 124 MMHG | DIASTOLIC BLOOD PRESSURE: 64 MMHG

## 2019-09-30 VITALS — DIASTOLIC BLOOD PRESSURE: 67 MMHG | SYSTOLIC BLOOD PRESSURE: 118 MMHG

## 2019-09-30 VITALS — SYSTOLIC BLOOD PRESSURE: 110 MMHG | DIASTOLIC BLOOD PRESSURE: 66 MMHG

## 2019-09-30 VITALS — SYSTOLIC BLOOD PRESSURE: 119 MMHG | DIASTOLIC BLOOD PRESSURE: 77 MMHG

## 2019-09-30 VITALS — DIASTOLIC BLOOD PRESSURE: 64 MMHG | SYSTOLIC BLOOD PRESSURE: 106 MMHG

## 2019-09-30 LAB
ANION GAP SERPL CALC-SCNC: 10 MMOL/L (ref 6–14)
BASOPHILS # BLD AUTO: 0 X10^3/UL (ref 0–0.2)
BASOPHILS NFR BLD: 0 % (ref 0–3)
BUN SERPL-MCNC: 16 MG/DL (ref 8–26)
CALCIUM SERPL-MCNC: 8.5 MG/DL (ref 8.5–10.1)
CHLORIDE SERPL-SCNC: 107 MMOL/L (ref 98–107)
CO2 SERPL-SCNC: 27 MMOL/L (ref 21–32)
CREAT SERPL-MCNC: 1 MG/DL (ref 0.7–1.3)
EOSINOPHIL NFR BLD: 0 % (ref 0–3)
EOSINOPHIL NFR BLD: 0 X10^3/UL (ref 0–0.7)
ERYTHROCYTE [DISTWIDTH] IN BLOOD BY AUTOMATED COUNT: 13.2 % (ref 11.5–14.5)
GFR SERPLBLD BASED ON 1.73 SQ M-ARVRAT: 80.4 ML/MIN
GLUCOSE SERPL-MCNC: 107 MG/DL (ref 70–99)
HCT VFR BLD CALC: 36.4 % (ref 39–53)
HGB BLD-MCNC: 12.5 G/DL (ref 13–17.5)
LYMPHOCYTES # BLD: 1.1 X10^3/UL (ref 1–4.8)
LYMPHOCYTES NFR BLD AUTO: 12 % (ref 24–48)
MCH RBC QN AUTO: 30 PG (ref 25–35)
MCHC RBC AUTO-ENTMCNC: 34 G/DL (ref 31–37)
MCV RBC AUTO: 88 FL (ref 79–100)
MONO #: 0.8 X10^3/UL (ref 0–1.1)
MONOCYTES NFR BLD: 9 % (ref 0–9)
NEUT #: 7.1 X10^3/UL (ref 1.8–7.7)
NEUTROPHILS NFR BLD AUTO: 78 % (ref 31–73)
PLATELET # BLD AUTO: 218 X10^3/UL (ref 140–400)
POTASSIUM SERPL-SCNC: 4.3 MMOL/L (ref 3.5–5.1)
PROTHROMBIN TIME: 13.2 SEC (ref 11.7–14)
RBC # BLD AUTO: 4.12 X10^6/UL (ref 4.3–5.7)
SODIUM SERPL-SCNC: 144 MMOL/L (ref 136–145)
VANC TR: 8.4 MCG/ML (ref 10–20)
WBC # BLD AUTO: 9.1 X10^3/UL (ref 4–11)

## 2019-09-30 RX ADMIN — POLYETHYLENE GLYCOL 3350 SCH GM: 17 POWDER, FOR SOLUTION ORAL at 10:46

## 2019-09-30 RX ADMIN — Medication SCH CAP: at 21:18

## 2019-09-30 RX ADMIN — PIPERACILLIN SODIUM AND TAZOBACTAM SODIUM SCH MLS/HR: 3; .375 INJECTION, POWDER, LYOPHILIZED, FOR SOLUTION INTRAVENOUS at 12:54

## 2019-09-30 RX ADMIN — PIPERACILLIN SODIUM AND TAZOBACTAM SODIUM SCH MLS/HR: 3; .375 INJECTION, POWDER, LYOPHILIZED, FOR SOLUTION INTRAVENOUS at 17:27

## 2019-09-30 RX ADMIN — PIPERACILLIN SODIUM AND TAZOBACTAM SODIUM SCH MLS/HR: 3; .375 INJECTION, POWDER, LYOPHILIZED, FOR SOLUTION INTRAVENOUS at 01:38

## 2019-09-30 RX ADMIN — HYDROCODONE BITARTRATE AND ACETAMINOPHEN PRN TAB: 5; 325 TABLET ORAL at 09:25

## 2019-09-30 RX ADMIN — PSYLLIUM HUSK SCH PKT: 3.4 POWDER ORAL at 21:18

## 2019-09-30 RX ADMIN — PIPERACILLIN SODIUM AND TAZOBACTAM SODIUM SCH MLS/HR: 3; .375 INJECTION, POWDER, LYOPHILIZED, FOR SOLUTION INTRAVENOUS at 05:46

## 2019-09-30 RX ADMIN — VANCOMYCIN HYDROCHLORIDE SCH MLS/HR: 1 INJECTION, POWDER, FOR SOLUTION INTRAVENOUS at 10:47

## 2019-09-30 RX ADMIN — HYDROCODONE BITARTRATE AND ACETAMINOPHEN PRN TAB: 5; 325 TABLET ORAL at 15:24

## 2019-09-30 RX ADMIN — VANCOMYCIN HYDROCHLORIDE PRN EACH: 1 INJECTION, POWDER, LYOPHILIZED, FOR SOLUTION INTRAVENOUS at 13:05

## 2019-09-30 RX ADMIN — HYDROCODONE BITARTRATE AND ACETAMINOPHEN PRN TAB: 5; 325 TABLET ORAL at 21:18

## 2019-09-30 NOTE — PDOC
PROGRESS NOTES


Chief Complaint


Chief Complaint


Right wrist cellulitis and abscess, septic arthritis


Obesity


Prediabetes


HLD


Anemia





History of Present Illness


History of Present Illness


Mr Sloan is a 48-year-old  male who on 09/27 was working at home when a 

wooden board, with a nail sticking out, fell, puncturing his right hand.  He 

says he wrapped his hand to stop the bleeding.  A few hours later, he arrived to

the ER with complaints of pain and swelling.  X-ray at the time showed no acute 

fracture, dislocation or radiopaque foreign body.  He was given a tetanus shot, 

prescription for Cipro and released home. 


Over the following 2 days, he says his hand became increasingly painful, swollen

with limited range of motion despite antibiotics.  He is now on vancomycin. Seen

by ID and Orthopedic surgery in consultation. now s/p Right wrist arthrotomy 

irrigation debridement septic wrist joint on 9/29/19.





He is tolerating PO pain medications well, still on IV antibiotics. No numbness 

or tingling in hand, it is slightly swollen. No diarrhea, no SOB or CP. He is 

asking to discharge soon.





Plan:


Will d/w ID antibiotic regimen


Add bowel regimen for opioid induced constipation





Vitals


Vitals





Vital Signs








  Date Time  Temp Pulse Resp B/P (MAP) Pulse Ox O2 Delivery O2 Flow Rate FiO2


 


9/30/19 07:00 97.7 63 18 118/67 (84) 96 Room Air  





 97.7       


 


9/29/19 12:04       10 











Physical Exam


General:  Alert, Oriented X3, Cooperative, No acute distress


Heart:  Regular rate, Normal S1, Normal S2


Lungs:  Clear


Abdomen:  Normal bowel sounds, Soft, No tenderness, No hepatosplenomegaly, No 

masses


Extremities:  Other (RT hand markedly swoelln, cant nake a fist, tenderness and 

warm up to rt forarm near elbow, punctired dorsal site hand wound visible, no 

bleeding today)





Labs


LABS





Laboratory Tests








Test


 9/29/19


10:25 9/30/19


05:30


 


Body Fluid Source Synovial  


 


Body Fluid Volume   


 


Body Fluid Color Red  


 


Body Fluid Clarity Cloudy  


 


Body Fluid Nucleated Cells


 6175 /cmm (Not


Established) 





 


Body Fluid Mononuclear WBCs


(%) 24 % 


 





 


Body Fluid Polymorphonuclear


Cells 70 % 


 





 


Body Fluid Total RBCs Counted


 60807 /cmm


(Not 





 


Body Fluid Other Cells (%) 6 %  


 


White Blood Count


 


 9.1 x10^3/uL


(4.0-11.0)


 


Red Blood Count


 


 4.12 x10^6/uL


(4.30-5.70)


 


Hemoglobin


 


 12.5 g/dL


(13.0-17.5)


 


Hematocrit


 


 36.4 %


(39.0-53.0)


 


Mean Corpuscular Volume  88 fL () 


 


Mean Corpuscular Hemoglobin  30 pg (25-35) 


 


Mean Corpuscular Hemoglobin


Concent 


 34 g/dL


(31-37)


 


Red Cell Distribution Width


 


 13.2 %


(11.5-14.5)


 


Platelet Count


 


 218 x10^3/uL


(140-400)


 


Neutrophils (%) (Auto)  78 % (31-73) 


 


Lymphocytes (%) (Auto)  12 % (24-48) 


 


Monocytes (%) (Auto)  9 % (0-9) 


 


Eosinophils (%) (Auto)  0 % (0-3) 


 


Basophils (%) (Auto)  0 % (0-3) 


 


Neutrophils # (Auto)


 


 7.1 x10^3/uL


(1.8-7.7)


 


Lymphocytes # (Auto)


 


 1.1 x10^3/uL


(1.0-4.8)


 


Monocytes # (Auto)


 


 0.8 x10^3/uL


(0.0-1.1)


 


Eosinophils # (Auto)


 


 0.0 x10^3/uL


(0.0-0.7)


 


Basophils # (Auto)


 


 0.0 x10^3/uL


(0.0-0.2)


 


Prothrombin Time


 


 13.2 SEC


(11.7-14.0)


 


Prothromb Time International


Ratio 


 1.0 (0.8-1.1) 





 


Sodium Level


 


 144 mmol/L


(136-145)


 


Potassium Level


 


 4.3 mmol/L


(3.5-5.1)


 


Chloride Level


 


 107 mmol/L


()


 


Carbon Dioxide Level


 


 27 mmol/L


(21-32)


 


Anion Gap  10 (6-14) 


 


Blood Urea Nitrogen


 


 16 mg/dL


(8-26)


 


Creatinine


 


 1.0 mg/dL


(0.7-1.3)


 


Estimated GFR


(Cockcroft-Gault) 


 80.4 





 


Glucose Level


 


 107 mg/dL


(70-99)


 


Calcium Level


 


 8.5 mg/dL


(8.5-10.1)











Assessment and Plan


Assessmemt and Plan


Problems


Medical Problems:


(1) Cellulitis of right hand


Status: Acute  











Comment


Review of Relevant


I have reviewed the following items tunde (where applicable) has been applied.


Labs





Laboratory Tests








Test


 9/29/19


07:37 9/29/19


10:25 9/30/19


05:30


 


White Blood Count


 9.8 x10^3/uL


(4.0-11.0) 


 9.1 x10^3/uL


(4.0-11.0)


 


Red Blood Count


 4.73 x10^6/uL


(4.30-5.70) 


 4.12 x10^6/uL


(4.30-5.70)


 


Hemoglobin


 14.4 g/dL


(13.0-17.5) 


 12.5 g/dL


(13.0-17.5)


 


Hematocrit


 41.5 %


(39.0-53.0) 


 36.4 %


(39.0-53.0)


 


Mean Corpuscular Volume 88 fL ()   88 fL () 


 


Mean Corpuscular Hemoglobin 31 pg (25-35)   30 pg (25-35) 


 


Mean Corpuscular Hemoglobin


Concent 35 g/dL


(31-37) 


 34 g/dL


(31-37)


 


Red Cell Distribution Width


 13.5 %


(11.5-14.5) 


 13.2 %


(11.5-14.5)


 


Platelet Count


 231 x10^3/uL


(140-400) 


 218 x10^3/uL


(140-400)


 


Neutrophils (%) (Auto) 68 % (31-73)   78 % (31-73) 


 


Lymphocytes (%) (Auto) 21 % (24-48)   12 % (24-48) 


 


Monocytes (%) (Auto) 10 % (0-9)   9 % (0-9) 


 


Eosinophils (%) (Auto) 0 % (0-3)   0 % (0-3) 


 


Basophils (%) (Auto) 1 % (0-3)   0 % (0-3) 


 


Neutrophils # (Auto)


 6.7 x10^3/uL


(1.8-7.7) 


 7.1 x10^3/uL


(1.8-7.7)


 


Lymphocytes # (Auto)


 2.1 x10^3/uL


(1.0-4.8) 


 1.1 x10^3/uL


(1.0-4.8)


 


Monocytes # (Auto)


 1.0 x10^3/uL


(0.0-1.1) 


 0.8 x10^3/uL


(0.0-1.1)


 


Eosinophils # (Auto)


 0.0 x10^3/uL


(0.0-0.7) 


 0.0 x10^3/uL


(0.0-0.7)


 


Basophils # (Auto)


 0.0 x10^3/uL


(0.0-0.2) 


 0.0 x10^3/uL


(0.0-0.2)


 


Erythrocyte Sedimentation Rate 23 (0-15)   


 


Sodium Level


 139 mmol/L


(136-145) 


 144 mmol/L


(136-145)


 


Potassium Level


 4.0 mmol/L


(3.5-5.1) 


 4.3 mmol/L


(3.5-5.1)


 


Chloride Level


 105 mmol/L


() 


 107 mmol/L


()


 


Carbon Dioxide Level


 31 mmol/L


(21-32) 


 27 mmol/L


(21-32)


 


Anion Gap 3 (6-14)   10 (6-14) 


 


Blood Urea Nitrogen


 15 mg/dL


(8-26) 


 16 mg/dL


(8-26)


 


Creatinine


 1.1 mg/dL


(0.7-1.3) 


 1.0 mg/dL


(0.7-1.3)


 


Estimated GFR


(Cockcroft-Gault) 72.1 


 


 80.4 





 


BUN/Creatinine Ratio 14 (6-20)   


 


Glucose Level


 104 mg/dL


(70-99) 


 107 mg/dL


(70-99)


 


Calcium Level


 9.6 mg/dL


(8.5-10.1) 


 8.5 mg/dL


(8.5-10.1)


 


Total Bilirubin


 0.7 mg/dL


(0.2-1.0) 


 





 


Aspartate Amino Transf


(AST/SGOT) 27 U/L (15-37) 


 


 





 


Alanine Aminotransferase


(ALT/SGPT) 53 U/L (16-63) 


 


 





 


Alkaline Phosphatase


 51 U/L


() 


 





 


Total Protein


 8.1 g/dL


(6.4-8.2) 


 





 


Albumin


 3.9 g/dL


(3.4-5.0) 


 





 


Albumin/Globulin Ratio 0.9 (1.0-1.7)   


 


Body Fluid Source  Synovial  


 


Body Fluid Volume    


 


Body Fluid Color  Red  


 


Body Fluid Clarity  Cloudy  


 


Body Fluid Nucleated Cells


 


 6175 /cmm (Not


Established) 





 


Body Fluid Mononuclear WBCs


(%) 


 24 % 


 





 


Body Fluid Polymorphonuclear


Cells 


 70 % 


 





 


Body Fluid Total RBCs Counted


 


 93927 /cmm


(Not 





 


Body Fluid Other Cells (%)  6 %  


 


Prothrombin Time


 


 


 13.2 SEC


(11.7-14.0)


 


Prothromb Time International


Ratio 


 


 1.0 (0.8-1.1) 











Laboratory Tests








Test


 9/29/19


10:25 9/30/19


05:30


 


Body Fluid Source Synovial  


 


Body Fluid Volume   


 


Body Fluid Color Red  


 


Body Fluid Clarity Cloudy  


 


Body Fluid Nucleated Cells


 6175 /cmm (Not


Established) 





 


Body Fluid Mononuclear WBCs


(%) 24 % 


 





 


Body Fluid Polymorphonuclear


Cells 70 % 


 





 


Body Fluid Total RBCs Counted


 54297 /cmm


(Not 





 


Body Fluid Other Cells (%) 6 %  


 


White Blood Count


 


 9.1 x10^3/uL


(4.0-11.0)


 


Red Blood Count


 


 4.12 x10^6/uL


(4.30-5.70)


 


Hemoglobin


 


 12.5 g/dL


(13.0-17.5)


 


Hematocrit


 


 36.4 %


(39.0-53.0)


 


Mean Corpuscular Volume  88 fL () 


 


Mean Corpuscular Hemoglobin  30 pg (25-35) 


 


Mean Corpuscular Hemoglobin


Concent 


 34 g/dL


(31-37)


 


Red Cell Distribution Width


 


 13.2 %


(11.5-14.5)


 


Platelet Count


 


 218 x10^3/uL


(140-400)


 


Neutrophils (%) (Auto)  78 % (31-73) 


 


Lymphocytes (%) (Auto)  12 % (24-48) 


 


Monocytes (%) (Auto)  9 % (0-9) 


 


Eosinophils (%) (Auto)  0 % (0-3) 


 


Basophils (%) (Auto)  0 % (0-3) 


 


Neutrophils # (Auto)


 


 7.1 x10^3/uL


(1.8-7.7)


 


Lymphocytes # (Auto)


 


 1.1 x10^3/uL


(1.0-4.8)


 


Monocytes # (Auto)


 


 0.8 x10^3/uL


(0.0-1.1)


 


Eosinophils # (Auto)


 


 0.0 x10^3/uL


(0.0-0.7)


 


Basophils # (Auto)


 


 0.0 x10^3/uL


(0.0-0.2)


 


Prothrombin Time


 


 13.2 SEC


(11.7-14.0)


 


Prothromb Time International


Ratio 


 1.0 (0.8-1.1) 





 


Sodium Level


 


 144 mmol/L


(136-145)


 


Potassium Level


 


 4.3 mmol/L


(3.5-5.1)


 


Chloride Level


 


 107 mmol/L


()


 


Carbon Dioxide Level


 


 27 mmol/L


(21-32)


 


Anion Gap  10 (6-14) 


 


Blood Urea Nitrogen


 


 16 mg/dL


(8-26)


 


Creatinine


 


 1.0 mg/dL


(0.7-1.3)


 


Estimated GFR


(Cockcroft-Gault) 


 80.4 





 


Glucose Level


 


 107 mg/dL


(70-99)


 


Calcium Level


 


 8.5 mg/dL


(8.5-10.1)








Microbiology


9/29/19 Blood Culture - Preliminary, Resulted


          NO GROWTH AFTER 1 DAY


Medications





Current Medications


Fentanyl Citrate (Fentanyl 2ml Vial) 25 mcg PRN Q15MIN  PRN IV PAIN GREATER THAN

3/10 Last administered on 9/29/19at 10:15;  Start 9/29/19 at 07:15;  Stop 

9/30/19 at 07:14;  Status DC


Vancomycin HCl 250 ml @  250 mls/hr 1X  ONCE IV  Last administered on 9/29/19at 

08:00;  Start 9/29/19 at 07:15;  Stop 9/29/19 at 08:14;  Status DC


Acetaminophen/ Hydrocodone Bitart (Lortab 5/325) 1 tab PRN Q6HRS  PRN PO 

MODERATE-SEVERE PAIN Last administered on 9/29/19at 14:02;  Start 9/29/19 at 

08:45


Morphine Sulfate (Morphine Sulfate) 2 mg PRN Q2HR  PRN IV PAIN;  Start 9/29/19 

at 08:45


Acetaminophen (Tylenol) 500 mg PRN Q6HRS  PRN PO MILD PAIN / TEMP;  Start 

9/29/19 at 08:45


Ondansetron HCl (Zofran) 4 mg PRN Q6HRS  PRN IVP NAUSEA/VOMITING;  Start 9/29/19

at 08:45


Vancomycin HCl (Vanco Per Pharmacy) 1 each PRN DAILY  PRN MC SEE COMMENTS Last 

administered on 9/29/19at 14:29;  Start 9/29/19 at 08:45


Clonidine HCl (Catapres) 0.1 mg PRN Q1HR  PRN PO HYPERTENSION;  Start 9/29/19 at

08:45


Vancomycin HCl 250 ml @  250 mls/hr 1X  ONCE IV  Last administered on 9/29/19at 

11:21;  Start 9/29/19 at 09:00;  Stop 9/29/19 at 09:59;  Status DC


Ondansetron HCl (Zofran) 4 mg PRN Q8HRS  PRN IV NAUSEA/VOMITING;  Start 9/29/19 

at 09:00;  Stop 9/30/19 at 08:59


Morphine Sulfate (Morphine Sulfate) 4 mg PRN Q2HR  PRN IV PAIN;  Start 9/29/19 

at 09:00;  Stop 9/30/19 at 08:59


Acetaminophen (Tylenol) 650 mg PRN Q4HRS  PRN PO FEVER;  Start 9/29/19 at 09:00;

 Stop 9/30/19 at 08:59


Sodium Chloride 1,000 ml @  100 mls/hr 1X  ONCE IV  Last administered on 

9/29/19at 14:05;  Start 9/29/19 at 09:45;  Stop 9/29/19 at 19:44;  Status DC


Propofol 20 ml @ As Directed STK-MED ONCE IV ;  Start 9/29/19 at 10:34;  Stop 

9/29/19 at 10:34;  Status DC


Lidocaine HCl (Lidocaine Pf 2% Vial) 5 ml STK-MED ONCE .ROUTE ;  Start 9/29/19 

at 10:34;  Stop 9/29/19 at 10:34;  Status DC


Fentanyl Citrate (Fentanyl 2ml Vial) 100 mcg STK-MED ONCE .ROUTE ;  Start 

9/29/19 at 10:34;  Stop 9/29/19 at 10:34;  Status DC


Fentanyl Citrate (Fentanyl 2ml Vial) 25 mcg PRN Q5MIN  PRN IV MILD PAIN 1-3;  

Start 9/29/19 at 10:45;  Stop 9/30/19 at 10:44


Fentanyl Citrate (Fentanyl 2ml Vial) 50 mcg PRN Q5MIN  PRN IV MODERATE TO SEVERE

PAIN;  Start 9/29/19 at 10:45;  Stop 9/30/19 at 10:44


Morphine Sulfate (Morphine Sulfate) 1 mg PRN Q10MIN  PRN IV SEVERE PAIN 7-10;  

Start 9/29/19 at 10:45;  Stop 9/30/19 at 10:44


Ringer's Solution 1,000 ml @  30 mls/hr Q24H IV  Last administered on 9/29/19at 

11:24;  Start 9/29/19 at 10:35;  Stop 9/29/19 at 22:34;  Status DC


Lidocaine HCl (Xylocaine-Mpf 1% 2ml Vial) 2 ml PRN 1X  PRN ID PRIOR TO IV START;

 Start 9/29/19 at 10:45;  Stop 9/30/19 at 10:44


Hydromorphone HCl (Dilaudid) 0.5 mg PRN Q10MIN  PRN IV SEV PAIN, Second choice; 

Start 9/29/19 at 10:45;  Stop 9/30/19 at 10:44


Prochlorperazine Edisylate (Compazine) 5 mg PACU PRN  PRN IV NAUSEA, MRX1;  

Start 9/29/19 at 10:45;  Stop 9/30/19 at 10:44


Piperacillin Sod/ Tazobactam Sod 3.375 gm/Sodium Chloride 50 ml @  100 mls/hr 

Q6HRS IV  Last administered on 9/30/19at 05:46;  Start 9/29/19 at 12:00


Ondansetron HCl (Zofran) 4 mg STK-MED ONCE .ROUTE ;  Start 9/29/19 at 11:37;  

Stop 9/29/19 at 11:38;  Status DC


Dexamethasone Sodium Phosphate (Decadron) 4 mg STK-MED ONCE .ROUTE ;  Start 

9/29/19 at 11:37;  Stop 9/29/19 at 11:38;  Status DC


Sevoflurane (Ultane) 15 ml STK-MED ONCE IH ;  Start 9/29/19 at 11:37;  Stop 

9/29/19 at 11:38;  Status DC


Vancomycin HCl 1.5 gm/Sodium Chloride 500 ml @  250 mls/hr Q12H IV  Last 

administered on 9/29/19at 23:07;  Start 9/29/19 at 23:30


Vancomycin HCl (Vancomycin Trough Level) 1 each 1X  ONCE MC ;  Start 9/30/19 at 

23:00;  Stop 9/30/19 at 23:01





Active Scripts


Active


Norco 5-325 Tablet (Acetaminophen/Hydrocodone Bitart) 1 Each Tablet 1 Tab PO PRN

Q6HRS PRN


Cipro (Ciprofloxacin Hcl) 250 Mg Tablet 1 Tab PO BID


Azithromycin Tablet (Azithromycin) 250 Mg Tablet 1 Pkg PO UD


Vitals/I & O





Vital Sign - Last 24 Hours








 9/29/19 9/29/19 9/29/19 9/29/19





 10:10 10:15 11:15 12:04


 


Temp   97.0 





   97.0 


 


Pulse 85  76 


 


Resp 16 16 20 


 


B/P (MAP) 138/87 (104)  131/78 


 


Pulse Ox 98  98 


 


O2 Delivery Room Air  Room Air Mask


 


O2 Flow Rate    10


 


    





    





 9/29/19 9/29/19 9/29/19 9/29/19





 12:04 12:19 12:34 12:41


 


Temp 97.0   





 97.0   


 


Pulse 78 79 80 79


 


Resp 22 22 20 20


 


B/P (MAP) 123/61 130/78 135/80 116/74


 


Pulse Ox 97 95 92 93


 


O2 Delivery Simple Mask Room Air Room Air Room Air


 


O2 Flow Rate 10   


 


    





    





 9/29/19 9/29/19 9/29/19 9/29/19





 12:55 13:15 13:27 13:27


 


Temp 98.0   





 98.0   


 


Pulse 78 76  


 


Resp 20 18  


 


B/P (MAP) 127/75 119/76 (90)  


 


Pulse Ox 93 94  


 


O2 Delivery Room Air Room Air Room Air Room Air


 


    





    





 9/29/19 9/29/19 9/29/19 9/29/19





 13:28 13:30 13:45 14:00


 


Pulse  80 86 


 


Resp  18 18 


 


B/P (MAP)  118/75 (89) 122/76 (91) 


 


Pulse Ox  93 94 


 


O2 Delivery Room Air Room Air Room Air Room Air





 9/29/19 9/29/19 9/29/19 9/29/19





 14:00 14:02 14:30 15:00


 


Pulse 85  90 89


 


Resp 18  18 18


 


B/P (MAP) 119/71 (87)  118/74 (89) 124/74 (91)


 


Pulse Ox 95  95 95


 


O2 Delivery Room Air Room Air Room Air Room Air





 9/29/19 9/29/19 9/29/19 9/29/19





 15:00 16:00 19:00 20:22


 


Temp  97.5 99.0 





  97.5 99.0 


 


Pulse  90 84 


 


Resp  18 16 


 


B/P (MAP)  118/65 (82) 99/63 (75) 


 


Pulse Ox  95 93 


 


O2 Delivery Room Air Room Air Room Air Room Air


 


    





    





 9/29/19 9/30/19 9/30/19 





 23:00 02:53 07:00 


 


Temp 98.2 98.0 97.7 





 98.2 98.0 97.7 


 


Pulse 79 70 63 


 


Resp 16 16 18 


 


B/P (MAP) 97/56 (70) 110/66 (81) 118/67 (84) 


 


Pulse Ox 94 95 96 


 


O2 Delivery Room Air Room Air Room Air 














Intake and Output   


 


 9/29/19 9/29/19 9/30/19





 15:00 23:00 07:00


 


Intake Total 900 ml 240 ml 1600 ml


 


Output Total 5 ml  0 ml


 


Balance 895 ml 240 ml 1600 ml

















JELENA ISRAEL MD        Sep 30, 2019 08:46

## 2019-09-30 NOTE — PDOC
Infectious Disease Note


Subjective


Subjective


s/p right wrist I&D septic wrist joint


c/o mild pain


wants to go home


No F/C/N/V/D





ROS


ROS


per HPI





Vital Sign


Vital Signs





Vital Signs








  Date Time  Temp Pulse Resp B/P (MAP) Pulse Ox O2 Delivery O2 Flow Rate FiO2


 


9/30/19 10:25   18   Room Air  


 


9/30/19 07:00 97.7 63  118/67 (84) 96   





 97.7       


 


9/29/19 12:04       10 











Physical Exam


PHYSICAL EXAM


GENERAL: Propped up in bed, alert, NAD 


HEENT:  Pupils are equally round and reactive.  Oropharynx pink and moist.


NECK:  Supple.


LUNGS:  Clear to auscultation.


CARDIAC:  S1 and S2.


ABDOMEN:  Soft and nontender.


EXTREMITIES:  Post-op dressing right hand dry,+ ice pack. Fingers are warm, less

swollen. 


SKIN:  Warm to touch.  No signs of rash.


NEUROLOGIC:  Alert, answering questions appropriately.





Labs


Lab





Laboratory Tests








Test


 9/30/19


05:30


 


White Blood Count


 9.1 x10^3/uL


(4.0-11.0)


 


Red Blood Count


 4.12 x10^6/uL


(4.30-5.70)


 


Hemoglobin


 12.5 g/dL


(13.0-17.5)


 


Hematocrit


 36.4 %


(39.0-53.0)


 


Mean Corpuscular Volume 88 fL () 


 


Mean Corpuscular Hemoglobin 30 pg (25-35) 


 


Mean Corpuscular Hemoglobin


Concent 34 g/dL


(31-37)


 


Red Cell Distribution Width


 13.2 %


(11.5-14.5)


 


Platelet Count


 218 x10^3/uL


(140-400)


 


Neutrophils (%) (Auto) 78 % (31-73) 


 


Lymphocytes (%) (Auto) 12 % (24-48) 


 


Monocytes (%) (Auto) 9 % (0-9) 


 


Eosinophils (%) (Auto) 0 % (0-3) 


 


Basophils (%) (Auto) 0 % (0-3) 


 


Neutrophils # (Auto)


 7.1 x10^3/uL


(1.8-7.7)


 


Lymphocytes # (Auto)


 1.1 x10^3/uL


(1.0-4.8)


 


Monocytes # (Auto)


 0.8 x10^3/uL


(0.0-1.1)


 


Eosinophils # (Auto)


 0.0 x10^3/uL


(0.0-0.7)


 


Basophils # (Auto)


 0.0 x10^3/uL


(0.0-0.2)


 


Prothrombin Time


 13.2 SEC


(11.7-14.0)


 


Prothromb Time International


Ratio 1.0 (0.8-1.1) 





 


Sodium Level


 144 mmol/L


(136-145)


 


Potassium Level


 4.3 mmol/L


(3.5-5.1)


 


Chloride Level


 107 mmol/L


()


 


Carbon Dioxide Level


 27 mmol/L


(21-32)


 


Anion Gap 10 (6-14) 


 


Blood Urea Nitrogen


 16 mg/dL


(8-26)


 


Creatinine


 1.0 mg/dL


(0.7-1.3)


 


Estimated GFR


(Cockcroft-Gault) 80.4 





 


Glucose Level


 107 mg/dL


(70-99)


 


Calcium Level


 8.5 mg/dL


(8.5-10.1)








Micro





Microbiology


9/29/19 Blood Culture - Preliminary, Resulted


          NO GROWTH AFTER 1 DAY





Objective


Assessment


Cellulitis of right hand/septic wrist


   - s/p joint aspiration: cloudy, WBC > 6000, 9/29 


   - s/p wrist arthrotomy, I and D, 9/29  


   - Failed OP cipro 


Puncture wound (nail) right hand.


Prediabetes


HLD





Plan


Plan of Care


Tetanus shot 9/27


Continue vancomycin and Zosyn


Monitor renal function closely 


f/u cultures





D/w Dr. Peterson last evening regarding findings





D/w family





Attending Co-Sign


Attending Co-Sign


The patient was seen and interviewed as well as examined at the bedside. The 

chart was reviewed. The case was discussed. Agree with the plan of care.











MIHAELA WALTER        Sep 30, 2019 11:24


WADE ROMAN MD              Sep 30, 2019 16:31

## 2019-09-30 NOTE — PDOC
ORTHO PROGRESS NOTES


Subjective


Patient resting comfortably in bed, with mild complaint of pain.


Post-op Day:  1


Procedure


Right Wrist Arthrotomy with irrigation and debridement of septic wrist joint.


Vitals





Vital Signs








  Date Time  Temp Pulse Resp B/P (MAP) Pulse Ox O2 Delivery O2 Flow Rate FiO2


 


9/30/19 09:25   18     


 


9/30/19 08:00      Room Air  


 


9/30/19 07:00 97.7 63  118/67 (84) 96   





 97.7       


 


9/29/19 12:04       10 








Labs





Laboratory Tests








Test


 9/29/19


07:37 9/29/19


10:25 9/30/19


05:30


 


White Blood Count


 9.8 x10^3/uL


(4.0-11.0) 


 9.1 x10^3/uL


(4.0-11.0)


 


Red Blood Count


 4.73 x10^6/uL


(4.30-5.70) 


 4.12 x10^6/uL


(4.30-5.70)


 


Hemoglobin


 14.4 g/dL


(13.0-17.5) 


 12.5 g/dL


(13.0-17.5)


 


Hematocrit


 41.5 %


(39.0-53.0) 


 36.4 %


(39.0-53.0)


 


Mean Corpuscular Volume 88 fL ()   88 fL () 


 


Mean Corpuscular Hemoglobin 31 pg (25-35)   30 pg (25-35) 


 


Mean Corpuscular Hemoglobin


Concent 35 g/dL


(31-37) 


 34 g/dL


(31-37)


 


Red Cell Distribution Width


 13.5 %


(11.5-14.5) 


 13.2 %


(11.5-14.5)


 


Platelet Count


 231 x10^3/uL


(140-400) 


 218 x10^3/uL


(140-400)


 


Neutrophils (%) (Auto) 68 % (31-73)   78 % (31-73) 


 


Lymphocytes (%) (Auto) 21 % (24-48)   12 % (24-48) 


 


Monocytes (%) (Auto) 10 % (0-9)   9 % (0-9) 


 


Eosinophils (%) (Auto) 0 % (0-3)   0 % (0-3) 


 


Basophils (%) (Auto) 1 % (0-3)   0 % (0-3) 


 


Neutrophils # (Auto)


 6.7 x10^3/uL


(1.8-7.7) 


 7.1 x10^3/uL


(1.8-7.7)


 


Lymphocytes # (Auto)


 2.1 x10^3/uL


(1.0-4.8) 


 1.1 x10^3/uL


(1.0-4.8)


 


Monocytes # (Auto)


 1.0 x10^3/uL


(0.0-1.1) 


 0.8 x10^3/uL


(0.0-1.1)


 


Eosinophils # (Auto)


 0.0 x10^3/uL


(0.0-0.7) 


 0.0 x10^3/uL


(0.0-0.7)


 


Basophils # (Auto)


 0.0 x10^3/uL


(0.0-0.2) 


 0.0 x10^3/uL


(0.0-0.2)


 


Erythrocyte Sedimentation Rate 23 (0-15)   


 


Sodium Level


 139 mmol/L


(136-145) 


 144 mmol/L


(136-145)


 


Potassium Level


 4.0 mmol/L


(3.5-5.1) 


 4.3 mmol/L


(3.5-5.1)


 


Chloride Level


 105 mmol/L


() 


 107 mmol/L


()


 


Carbon Dioxide Level


 31 mmol/L


(21-32) 


 27 mmol/L


(21-32)


 


Anion Gap 3 (6-14)   10 (6-14) 


 


Blood Urea Nitrogen


 15 mg/dL


(8-26) 


 16 mg/dL


(8-26)


 


Creatinine


 1.1 mg/dL


(0.7-1.3) 


 1.0 mg/dL


(0.7-1.3)


 


Estimated GFR


(Cockcroft-Gault) 72.1 


 


 80.4 





 


BUN/Creatinine Ratio 14 (6-20)   


 


Glucose Level


 104 mg/dL


(70-99) 


 107 mg/dL


(70-99)


 


Calcium Level


 9.6 mg/dL


(8.5-10.1) 


 8.5 mg/dL


(8.5-10.1)


 


Total Bilirubin


 0.7 mg/dL


(0.2-1.0) 


 





 


Aspartate Amino Transf


(AST/SGOT) 27 U/L (15-37) 


 


 





 


Alanine Aminotransferase


(ALT/SGPT) 53 U/L (16-63) 


 


 





 


Alkaline Phosphatase


 51 U/L


() 


 





 


Total Protein


 8.1 g/dL


(6.4-8.2) 


 





 


Albumin


 3.9 g/dL


(3.4-5.0) 


 





 


Albumin/Globulin Ratio 0.9 (1.0-1.7)   


 


Body Fluid Source  Synovial  


 


Body Fluid Volume    


 


Body Fluid Color  Red  


 


Body Fluid Clarity  Cloudy  


 


Body Fluid Nucleated Cells


 


 6175 /cmm (Not


Established) 





 


Body Fluid Mononuclear WBCs


(%) 


 24 % 


 





 


Body Fluid Polymorphonuclear


Cells 


 70 % 


 





 


Body Fluid Total RBCs Counted


 


 16059 /cmm


(Not 





 


Body Fluid Other Cells (%)  6 %  


 


Prothrombin Time


 


 


 13.2 SEC


(11.7-14.0)


 


Prothromb Time International


Ratio 


 


 1.0 (0.8-1.1) 











Laboratory Tests








Test


 9/29/19


10:25 9/30/19


05:30


 


Body Fluid Source Synovial  


 


Body Fluid Volume   


 


Body Fluid Color Red  


 


Body Fluid Clarity Cloudy  


 


Body Fluid Nucleated Cells


 6175 /cmm (Not


Established) 





 


Body Fluid Mononuclear WBCs


(%) 24 % 


 





 


Body Fluid Polymorphonuclear


Cells 70 % 


 





 


Body Fluid Total RBCs Counted


 50397 /cmm


(Not 





 


Body Fluid Other Cells (%) 6 %  


 


White Blood Count


 


 9.1 x10^3/uL


(4.0-11.0)


 


Red Blood Count


 


 4.12 x10^6/uL


(4.30-5.70)


 


Hemoglobin


 


 12.5 g/dL


(13.0-17.5)


 


Hematocrit


 


 36.4 %


(39.0-53.0)


 


Mean Corpuscular Volume  88 fL () 


 


Mean Corpuscular Hemoglobin  30 pg (25-35) 


 


Mean Corpuscular Hemoglobin


Concent 


 34 g/dL


(31-37)


 


Red Cell Distribution Width


 


 13.2 %


(11.5-14.5)


 


Platelet Count


 


 218 x10^3/uL


(140-400)


 


Neutrophils (%) (Auto)  78 % (31-73) 


 


Lymphocytes (%) (Auto)  12 % (24-48) 


 


Monocytes (%) (Auto)  9 % (0-9) 


 


Eosinophils (%) (Auto)  0 % (0-3) 


 


Basophils (%) (Auto)  0 % (0-3) 


 


Neutrophils # (Auto)


 


 7.1 x10^3/uL


(1.8-7.7)


 


Lymphocytes # (Auto)


 


 1.1 x10^3/uL


(1.0-4.8)


 


Monocytes # (Auto)


 


 0.8 x10^3/uL


(0.0-1.1)


 


Eosinophils # (Auto)


 


 0.0 x10^3/uL


(0.0-0.7)


 


Basophils # (Auto)


 


 0.0 x10^3/uL


(0.0-0.2)


 


Prothrombin Time


 


 13.2 SEC


(11.7-14.0)


 


Prothromb Time International


Ratio 


 1.0 (0.8-1.1) 





 


Sodium Level


 


 144 mmol/L


(136-145)


 


Potassium Level


 


 4.3 mmol/L


(3.5-5.1)


 


Chloride Level


 


 107 mmol/L


()


 


Carbon Dioxide Level


 


 27 mmol/L


(21-32)


 


Anion Gap  10 (6-14) 


 


Blood Urea Nitrogen


 


 16 mg/dL


(8-26)


 


Creatinine


 


 1.0 mg/dL


(0.7-1.3)


 


Estimated GFR


(Cockcroft-Gault) 


 80.4 





 


Glucose Level


 


 107 mg/dL


(70-99)


 


Calcium Level


 


 8.5 mg/dL


(8.5-10.1)








Notes


awake and alert


Assessment and Plan


S/P right wrist I&D septic wrist joint


dressing dry and intact


moving fingers well


motor and sensation intact distally


ok to d/c per ortho standpoint when pain controlled and if able to take PO 

antibiotics


F/u in clinic in approx 2 weeks, call for SANTIAGO Cervantes            Sep 30, 2019 10:17

## 2019-10-01 VITALS — DIASTOLIC BLOOD PRESSURE: 75 MMHG | SYSTOLIC BLOOD PRESSURE: 126 MMHG

## 2019-10-01 VITALS — SYSTOLIC BLOOD PRESSURE: 117 MMHG | DIASTOLIC BLOOD PRESSURE: 69 MMHG

## 2019-10-01 VITALS — SYSTOLIC BLOOD PRESSURE: 110 MMHG | DIASTOLIC BLOOD PRESSURE: 65 MMHG

## 2019-10-01 VITALS — DIASTOLIC BLOOD PRESSURE: 74 MMHG | SYSTOLIC BLOOD PRESSURE: 116 MMHG

## 2019-10-01 VITALS — DIASTOLIC BLOOD PRESSURE: 73 MMHG | SYSTOLIC BLOOD PRESSURE: 129 MMHG

## 2019-10-01 VITALS — SYSTOLIC BLOOD PRESSURE: 122 MMHG | DIASTOLIC BLOOD PRESSURE: 79 MMHG

## 2019-10-01 LAB
PROTHROMBIN TIME: 12.9 SEC (ref 11.7–14)
VANC TR: 17 MCG/ML (ref 10–20)

## 2019-10-01 RX ADMIN — VANCOMYCIN HYDROCHLORIDE SCH MLS/HR: 1 INJECTION, POWDER, FOR SOLUTION INTRAVENOUS at 15:50

## 2019-10-01 RX ADMIN — VANCOMYCIN HYDROCHLORIDE PRN EACH: 1 INJECTION, POWDER, LYOPHILIZED, FOR SOLUTION INTRAVENOUS at 00:50

## 2019-10-01 RX ADMIN — Medication SCH CAP: at 07:46

## 2019-10-01 RX ADMIN — POLYETHYLENE GLYCOL 3350 SCH GM: 17 POWDER, FOR SOLUTION ORAL at 07:47

## 2019-10-01 RX ADMIN — PIPERACILLIN SODIUM AND TAZOBACTAM SODIUM SCH MLS/HR: 3; .375 INJECTION, POWDER, LYOPHILIZED, FOR SOLUTION INTRAVENOUS at 23:41

## 2019-10-01 RX ADMIN — PIPERACILLIN SODIUM AND TAZOBACTAM SODIUM SCH MLS/HR: 3; .375 INJECTION, POWDER, LYOPHILIZED, FOR SOLUTION INTRAVENOUS at 17:56

## 2019-10-01 RX ADMIN — VANCOMYCIN HYDROCHLORIDE SCH MLS/HR: 1 INJECTION, POWDER, FOR SOLUTION INTRAVENOUS at 07:54

## 2019-10-01 RX ADMIN — PIPERACILLIN SODIUM AND TAZOBACTAM SODIUM SCH MLS/HR: 3; .375 INJECTION, POWDER, LYOPHILIZED, FOR SOLUTION INTRAVENOUS at 00:30

## 2019-10-01 RX ADMIN — PSYLLIUM HUSK SCH PKT: 3.4 POWDER ORAL at 19:38

## 2019-10-01 RX ADMIN — PIPERACILLIN SODIUM AND TAZOBACTAM SODIUM SCH MLS/HR: 3; .375 INJECTION, POWDER, LYOPHILIZED, FOR SOLUTION INTRAVENOUS at 11:18

## 2019-10-01 RX ADMIN — VANCOMYCIN HYDROCHLORIDE SCH MLS/HR: 1 INJECTION, POWDER, FOR SOLUTION INTRAVENOUS at 01:15

## 2019-10-01 RX ADMIN — PIPERACILLIN SODIUM AND TAZOBACTAM SODIUM SCH MLS/HR: 3; .375 INJECTION, POWDER, LYOPHILIZED, FOR SOLUTION INTRAVENOUS at 05:52

## 2019-10-01 RX ADMIN — HYDROCODONE BITARTRATE AND ACETAMINOPHEN PRN TAB: 5; 325 TABLET ORAL at 07:46

## 2019-10-01 RX ADMIN — Medication SCH CAP: at 21:07

## 2019-10-01 RX ADMIN — HYDROCODONE BITARTRATE AND ACETAMINOPHEN PRN TAB: 5; 325 TABLET ORAL at 13:59

## 2019-10-01 RX ADMIN — VANCOMYCIN HYDROCHLORIDE PRN EACH: 1 INJECTION, POWDER, LYOPHILIZED, FOR SOLUTION INTRAVENOUS at 15:57

## 2019-10-01 NOTE — NUR
Pharmacy Vancomycin Dosing Note



S:Consulted to monitor and dose vancomycin started 09/29/19.



O:LLOYD MCCRACKEN is a 48 year old M with 

Cellulitis SEPTIC ARTHRITIS .



Height: 5 feet, 7 inches

Weight: 89.893986 kg

Ideal Body Weight: 66.10 

Adjusted Body Weight: 75.94 

Dosing Weight: Actual



Other Antibiotics: 

ZOSYN



LABS:

Last BUN: 16 

Last Creatinine: 1 

Creatinine Clearance: 90 mL/min

Last WBC: 9.8 

Last Procalcitonin: 

Tmax (past 24 hours): 98.0 



Microbiology: 

9/30 9/29 BCX NGTD



I/O: NO UO/VOIDS RECORDED BUT SCR NORMAL 



Drug Levels:

Last Trough level: 8.4  on 09/30/19 at 2300 

Last dose given 09/30/19 at 1047 



Vancomycin Dosing:

Loading Dose: 2000 mg x1

Dosing Weight: Actual

Target Trough: 15-20





A: Based on: TROUGH





P: 1. Begin Vancomycin 1500 mg IV q8h

   2. Follow up Trough level on 10/01/19 at 2330 

   3. Pharmacy will continue to monitor, follow and adjust therapy as needed.

 

 FRED GONZALEZ RPH, 10/01/19 0050

-------------------------------------------------------------------------------

Signed:    10/01/19 at 0050 by FRED GONZALEZ RPH PHA

-------------------------------------------------------------------------------

## 2019-10-01 NOTE — NUR
SW consulted for Self Pay. Chart reviewed and discussed with RN. SW provided pt with Self 
pay resource guide. Pt accepted resources and verbalized understanding.

## 2019-10-01 NOTE — PDOC
Infectious Disease Note


Subjective


Subjective


c/o mild pain


No F/C/N/V/D





ROS


ROS


per HPI





Vital Sign


Vital Signs





Vital Signs








  Date Time  Temp Pulse Resp B/P (MAP) Pulse Ox O2 Delivery O2 Flow Rate FiO2


 


10/1/19 08:46   18   Room Air  


 


10/1/19 07:00 97.5 66  122/79 (93) 95   





 97.5       


 


9/30/19 22:18       10.0 











Physical Exam


PHYSICAL EXAM


GENERAL: Propped up in bed, alert, smiling 


HEENT:  Pupils are equally round and reactive.  Oropharynx pink and moist.


NECK:  Supple.


LUNGS:  Clear to auscultation.


CARDIAC:  S1 and S2.


ABDOMEN:  Soft and nontender.


EXTREMITIES:  Post-op dressing right hand dry, + wiggles fingers, warm, less 

swollen. 


SKIN:  Warm to touch.  No signs of rash.


NEUROLOGIC:  Alert, answering questions appropriately.


PIV





Labs


Lab





Laboratory Tests








Test


 9/30/19


23:05 10/1/19


03:14


 


Vancomycin Level Trough


 8.4 mcg/mL


(10.0-20.0) 





 


Vancomycin Last Dose Date 9/30/19  


 


Vancomycin Last Dose Time 1130  


 


Prothrombin Time


 


 12.9 SEC


(11.7-14.0)


 


Prothromb Time International


Ratio 


 1.0 (0.8-1.1) 











Micro





Microbiology


9/29/19 Blood Culture - Preliminary, Resulted


          NO GROWTH AFTER 2 DAY











9/29. wrist


  GRAM STAIN RES 1  Final  


        Comment





        Moderate amount of white blood cells.





  GRAM STAIN RES 2  Final  


        No organisms seen





Objective


Assessment


Cellulitis of right hand/septic wrist


   - s/p joint aspiration: cloudy, WBC > 6000, 9/29 


   - s/p wrist arthrotomy, I and D, 9/29  


   - Failed OP cipro 


Puncture wound (nail) right hand.


Prediabetes


HLD





Plan


Plan of Care


Tetanus shot 9/27


Continue vancomycin and Zosyn


Place mid line


Case management for outpatient IV abx


Monitor renal function closely 


Trough 8.4 


f/u cultures





D/w family at bedside





Attending Co-Sign


Attending Co-Sign


The patient was seen and interviewed as well as examined at the bedside. The 

chart was reviewed. The case was discussed. Agree with the plan of care.











MIHAELA WALTER         Oct 1, 2019 11:29


WADE ROMAN MD               Oct 1, 2019 15:39

## 2019-10-01 NOTE — NUR
Vicky Nursing Supervisor notified for patient need of Midline placement per Dr. Ma's 
order, as patient is not going home today, will be done tomorrow as no PICC RN today per 
Vicky Nursing Supervisor.

## 2019-10-01 NOTE — NUR
Lizzie Johnson [facility]. [facility]. to insert Midline tonight.  Consent witnessed.  
Patient verb. understanding POC.

## 2019-10-01 NOTE — PDOC
PROGRESS NOTES


Chief Complaint


Chief Complaint


Right wrist cellulitis and abscess, septic arthritis - s/p joint aspiration with

WBC > 6K on arthrotomy and I&D on 9/29/2019, failed outpatient therapy on oral 

antibiotics (cipro)


Obesity


Prediabetes


HLD


Anemia





History of Present Illness


History of Present Illness


Mr Sloan is a 48-year-old  male who on 09/27 was working at home when a 

wooden board, with a nail sticking out, fell, puncturing his right hand.  He 

says he wrapped his hand to stop the bleeding.  A few hours later, he arrived to

the ER with complaints of pain and swelling.  X-ray at the time showed no acute 

fracture, dislocation or radiopaque foreign body.  He was given a tetanus shot, 

prescription for Cipro and released home. 


Over the following 2 days, he says his hand became increasingly painful, swollen

with limited range of motion despite antibiotics.  He is now on vancomycin. Seen

by ID and Orthopedic surgery in consultation. now s/p Right wrist arthrotomy 

irrigation debridement septic wrist joint on 9/29/19.





He is tolerating PO pain medications well, still on IV antibiotics. No numbness 

or tingling in hand, it is slightly swollen. No diarrhea, no SOB or CP. He is 

asking to discharge soon.





Plan:


Will d/w ID antibiotic regimen - will await cultures, based on his septic joint 

and outpatient failure we will need final cultures prior to d/c.


Added bowel regimen for opioid induced constipation





Vitals


Vitals





Vital Signs








  Date Time  Temp Pulse Resp B/P (MAP) Pulse Ox O2 Delivery O2 Flow Rate FiO2


 


10/1/19 07:46   18     


 


10/1/19 03:00 98.4 66  110/65 (80) 93 Room Air  





 98.4       


 


9/30/19 22:18       10.0 











Physical Exam


Physical Exam


GENERAL: Propped up in bed, alert, NAD 


HEENT:  Pupils are equally round and reactive.  Oropharynx pink and moist.


NECK:  Supple.


LUNGS:  Clear to auscultation.


CARDIAC:  S1 and S2.


ABDOMEN:  Soft and nontender.


EXTREMITIES:  Post-op dressing right hand dry,+ ice pack. Fingers are warm, less

swollen. 


SKIN:  Warm to touch.  No signs of rash.


NEUROLOGIC:  Alert, answering questions appropriately.


General:  Alert, Oriented X3, Cooperative, No acute distress


Heart:  Regular rate, Normal S1, Normal S2


Lungs:  Clear


Abdomen:  Normal bowel sounds, Soft, No tenderness, No hepatosplenomegaly, No 

masses


Extremities:  Other (RT hand markedly swoelln, cant nake a fist, tenderness and 

warm up to rt forarm near elbow, punctired dorsal site hand wound visible, no 

bleeding today)





Labs


LABS





Laboratory Tests








Test


 9/30/19


23:05 10/1/19


03:14


 


Vancomycin Level Trough


 8.4 mcg/mL


(10.0-20.0) 





 


Vancomycin Last Dose Date 9/30/19  


 


Vancomycin Last Dose Time 1130  


 


Prothrombin Time


 


 12.9 SEC


(11.7-14.0)


 


Prothromb Time International


Ratio 


 1.0 (0.8-1.1) 














Assessment and Plan


Assessmemt and Plan


Problems


Medical Problems:


(1) Cellulitis of right hand


Status: Acute  











Comment


Review of Relevant


I have reviewed the following items tunde (where applicable) has been applied.


Labs





Laboratory Tests








Test


 9/29/19


10:25 9/30/19


05:30 9/30/19


23:05 10/1/19


03:14


 


Body Fluid Source Synovial    


 


Body Fluid Volume     


 


Body Fluid Color Red    


 


Body Fluid Clarity Cloudy    


 


Body Fluid Nucleated Cells


 6175 /cmm (Not


Established) 


 


 





 


Body Fluid Mononuclear WBCs


(%) 24 % 


 


 


 





 


Body Fluid Polymorphonuclear


Cells 70 % 


 


 


 





 


Body Fluid Total RBCs Counted


 35749 /cmm


(Not 


 


 





 


Body Fluid Other Cells (%) 6 %    


 


White Blood Count


 


 9.1 x10^3/uL


(4.0-11.0) 


 





 


Red Blood Count


 


 4.12 x10^6/uL


(4.30-5.70) 


 





 


Hemoglobin


 


 12.5 g/dL


(13.0-17.5) 


 





 


Hematocrit


 


 36.4 %


(39.0-53.0) 


 





 


Mean Corpuscular Volume  88 fL ()   


 


Mean Corpuscular Hemoglobin  30 pg (25-35)   


 


Mean Corpuscular Hemoglobin


Concent 


 34 g/dL


(31-37) 


 





 


Red Cell Distribution Width


 


 13.2 %


(11.5-14.5) 


 





 


Platelet Count


 


 218 x10^3/uL


(140-400) 


 





 


Neutrophils (%) (Auto)  78 % (31-73)   


 


Lymphocytes (%) (Auto)  12 % (24-48)   


 


Monocytes (%) (Auto)  9 % (0-9)   


 


Eosinophils (%) (Auto)  0 % (0-3)   


 


Basophils (%) (Auto)  0 % (0-3)   


 


Neutrophils # (Auto)


 


 7.1 x10^3/uL


(1.8-7.7) 


 





 


Lymphocytes # (Auto)


 


 1.1 x10^3/uL


(1.0-4.8) 


 





 


Monocytes # (Auto)


 


 0.8 x10^3/uL


(0.0-1.1) 


 





 


Eosinophils # (Auto)


 


 0.0 x10^3/uL


(0.0-0.7) 


 





 


Basophils # (Auto)


 


 0.0 x10^3/uL


(0.0-0.2) 


 





 


Prothrombin Time


 


 13.2 SEC


(11.7-14.0) 


 12.9 SEC


(11.7-14.0)


 


Prothromb Time International


Ratio 


 1.0 (0.8-1.1) 


 


 1.0 (0.8-1.1) 





 


Sodium Level


 


 144 mmol/L


(136-145) 


 





 


Potassium Level


 


 4.3 mmol/L


(3.5-5.1) 


 





 


Chloride Level


 


 107 mmol/L


() 


 





 


Carbon Dioxide Level


 


 27 mmol/L


(21-32) 


 





 


Anion Gap  10 (6-14)   


 


Blood Urea Nitrogen


 


 16 mg/dL


(8-26) 


 





 


Creatinine


 


 1.0 mg/dL


(0.7-1.3) 


 





 


Estimated GFR


(Cockcroft-Gault) 


 80.4 


 


 





 


Glucose Level


 


 107 mg/dL


(70-99) 


 





 


Calcium Level


 


 8.5 mg/dL


(8.5-10.1) 


 





 


Vancomycin Level Trough


 


 


 8.4 mcg/mL


(10.0-20.0) 





 


Vancomycin Last Dose Date   9/30/19  


 


Vancomycin Last Dose Time   1130  








Laboratory Tests








Test


 9/30/19


23:05 10/1/19


03:14


 


Vancomycin Level Trough


 8.4 mcg/mL


(10.0-20.0) 





 


Vancomycin Last Dose Date 9/30/19  


 


Vancomycin Last Dose Time 1130  


 


Prothrombin Time


 


 12.9 SEC


(11.7-14.0)


 


Prothromb Time International


Ratio 


 1.0 (0.8-1.1) 











Microbiology


9/29/19 Anaerobic/Aerobic Culture, Resulted


          Pending


9/29/19 Anaerobic Culture Result 1 (LUDIVINA), Resulted


          Pending


9/29/19 Aerobic Culture, Resulted


          Pending


9/29/19 Aerobic Culture Result 1 (LUDIVINA), Resulted


          Pending


9/29/19 Gram Stain - Final, Resulted


          


9/29/19 Gram Stain Result 1 (LUDIVINA) - Final, Resulted


          


9/29/19 Gram Stain Result 2 (LUDIVINA) - Final, Resulted


          


9/29/19 Blood Culture - Preliminary, Resulted


          NO GROWTH AFTER 2 DAYS


Medications





Current Medications


Fentanyl Citrate (Fentanyl 2ml Vial) 25 mcg PRN Q15MIN  PRN IV PAIN GREATER THAN

3/10 Last administered on 9/29/19at 10:15;  Start 9/29/19 at 07:15;  Stop 9/ 30/19 at 07:14;  Status DC


Vancomycin HCl 250 ml @  250 mls/hr 1X  ONCE IV  Last administered on 9/29/19at 

08:00;  Start 9/29/19 at 07:15;  Stop 9/29/19 at 08:14;  Status DC


Acetaminophen/ Hydrocodone Bitart (Lortab 5/325) 1 tab PRN Q6HRS  PRN PO 

MODERATE-SEVERE PAIN Last administered on 10/1/19at 07:46;  Start 9/29/19 at 

08:45


Morphine Sulfate (Morphine Sulfate) 2 mg PRN Q2HR  PRN IV PAIN;  Start 9/29/19 

at 08:45;  Stop 9/30/19 at 10:11;  Status DC


Acetaminophen (Tylenol) 500 mg PRN Q6HRS  PRN PO MILD PAIN / TEMP;  Start 

9/29/19 at 08:45


Ondansetron HCl (Zofran) 4 mg PRN Q6HRS  PRN IVP NAUSEA/VOMITING;  Start 9/29/19

at 08:45


Vancomycin HCl (Vanco Per Pharmacy) 1 each PRN DAILY  PRN MC SEE COMMENTS Last 

administered on 10/1/19at 00:50;  Start 9/29/19 at 08:45


Clonidine HCl (Catapres) 0.1 mg PRN Q1HR  PRN PO HYPERTENSION;  Start 9/29/19 at

08:45


Vancomycin HCl 250 ml @  250 mls/hr 1X  ONCE IV  Last administered on 9/29/19at 

11:21;  Start 9/29/19 at 09:00;  Stop 9/29/19 at 09:59;  Status DC


Ondansetron HCl (Zofran) 4 mg PRN Q8HRS  PRN IV NAUSEA/VOMITING;  Start 9/29/19 

at 09:00;  Stop 9/30/19 at 08:59;  Status DC


Morphine Sulfate (Morphine Sulfate) 4 mg PRN Q2HR  PRN IV PAIN;  Start 9/29/19 

at 09:00;  Stop 9/30/19 at 08:59;  Status DC


Acetaminophen (Tylenol) 650 mg PRN Q4HRS  PRN PO FEVER;  Start 9/29/19 at 09:00;

 Stop 9/30/19 at 08:59;  Status DC


Sodium Chloride 1,000 ml @  100 mls/hr 1X  ONCE IV  Last administered on 

9/29/19at 14:05;  Start 9/29/19 at 09:45;  Stop 9/29/19 at 19:44;  Status DC


Propofol 20 ml @ As Directed STK-MED ONCE IV ;  Start 9/29/19 at 10:34;  Stop 

9/29/19 at 10:34;  Status DC


Lidocaine HCl (Lidocaine Pf 2% Vial) 5 ml STK-MED ONCE .ROUTE ;  Start 9/29/19 

at 10:34;  Stop 9/29/19 at 10:34;  Status DC


Fentanyl Citrate (Fentanyl 2ml Vial) 100 mcg STK-MED ONCE .ROUTE ;  Start 

9/29/19 at 10:34;  Stop 9/29/19 at 10:34;  Status DC


Fentanyl Citrate (Fentanyl 2ml Vial) 25 mcg PRN Q5MIN  PRN IV MILD PAIN 1-3;  

Start 9/29/19 at 10:45;  Stop 9/30/19 at 10:44;  Status DC


Fentanyl Citrate (Fentanyl 2ml Vial) 50 mcg PRN Q5MIN  PRN IV MODERATE TO SEVERE

PAIN;  Start 9/29/19 at 10:45;  Stop 9/30/19 at 10:44;  Status DC


Morphine Sulfate (Morphine Sulfate) 1 mg PRN Q10MIN  PRN IV SEVERE PAIN 7-10;  

Start 9/29/19 at 10:45;  Stop 9/30/19 at 10:11;  Status DC


Ringer's Solution 1,000 ml @  30 mls/hr Q24H IV  Last administered on 9/29/19at 

11:24;  Start 9/29/19 at 10:35;  Stop 9/29/19 at 22:34;  Status DC


Lidocaine HCl (Xylocaine-Mpf 1% 2ml Vial) 2 ml PRN 1X  PRN ID PRIOR TO IV START;

 Start 9/29/19 at 10:45;  Stop 9/30/19 at 10:44;  Status DC


Hydromorphone HCl (Dilaudid) 0.5 mg PRN Q10MIN  PRN IV SEV PAIN, Second choice; 

Start 9/29/19 at 10:45;  Stop 9/30/19 at 10:44;  Status DC


Prochlorperazine Edisylate (Compazine) 5 mg PACU PRN  PRN IV NAUSEA, MRX1;  

Start 9/29/19 at 10:45;  Stop 9/30/19 at 10:44;  Status DC


Piperacillin Sod/ Tazobactam Sod 3.375 gm/Sodium Chloride 50 ml @  100 mls/hr 

Q6HRS IV  Last administered on 10/1/19at 05:52;  Start 9/29/19 at 12:00


Ondansetron HCl (Zofran) 4 mg STK-MED ONCE .ROUTE ;  Start 9/29/19 at 11:37;  

Stop 9/29/19 at 11:38;  Status DC


Dexamethasone Sodium Phosphate (Decadron) 4 mg STK-MED ONCE .ROUTE ;  Start 

9/29/19 at 11:37;  Stop 9/29/19 at 11:38;  Status DC


Sevoflurane (Ultane) 15 ml STK-MED ONCE IH ;  Start 9/29/19 at 11:37;  Stop 

9/29/19 at 11:38;  Status DC


Vancomycin HCl 1.5 gm/Sodium Chloride 500 ml @  250 mls/hr Q12H IV  Last 

administered on 9/30/19at 10:47;  Start 9/29/19 at 23:30;  Stop 9/30/19 at 

23:50;  Status DC


Vancomycin HCl (Vancomycin Trough Level) 1 each 1X  ONCE MC  Last administered 

on 9/30/19at 23:00;  Start 9/30/19 at 23:00;  Stop 9/30/19 at 23:01;  Status DC


Polyethylene Glycol (miraLAX PACKET) 17 gm DAILY PO  Last administered on 

10/1/19at 07:47;  Start 9/30/19 at 11:00


Psyllium Hydrophilic Mucilloid (Metamucil Fiber Packet) 1 pkt QHS PO  Last 

administered on 9/30/19at 21:18;  Start 9/30/19 at 21:00


Lactobacillus Rhamnosus (Culturelle) 1 cap BID PO  Last administered on 

10/1/19at 07:46;  Start 9/30/19 at 21:00


Vancomycin HCl 1.5 gm/Sodium Chloride 500 ml @  250 mls/hr Q8H IV  Last 

administered on 10/1/19at 07:54;  Start 10/1/19 at 00:00


Vancomycin HCl (Vancomycin Trough Level) 1 each 1X  ONCE MC ;  Start 10/1/19 at 

23:30;  Stop 10/1/19 at 23:31





Active Scripts


Active


Norco 5-325 Tablet (Acetaminophen/Hydrocodone Bitart) 1 Each Tablet 1 Tab PO PRN

Q6HRS PRN


Cipro (Ciprofloxacin Hcl) 250 Mg Tablet 1 Tab PO BID


Azithromycin Tablet (Azithromycin) 250 Mg Tablet 1 Pkg PO UD


Vitals/I & O





Vital Sign - Last 24 Hours








 9/30/19 9/30/19 9/30/19 9/30/19





 09:25 10:25 11:00 14:47


 


Temp   97.7 97.7





   97.7 97.7


 


Pulse   69 69


 


Resp 18 18 18 18


 


B/P (MAP)   124/64 (84) 122/62 (82)


 


Pulse Ox   96 96


 


O2 Delivery  Room Air Room Air Room Air


 


    





    





 9/30/19 9/30/19 9/30/19 9/30/19





 15:24 16:24 19:00 20:00


 


Temp   97.7 





   97.7 


 


Pulse   68 


 


Resp 18 18 18 


 


B/P (MAP)   119/77 (91) 


 


Pulse Ox   95 


 


O2 Delivery Room Air Room Air Room Air Room Air


 


    





    





 9/30/19 9/30/19 9/30/19 10/1/19





 21:18 22:18 23:00 03:00


 


Temp   98.2 98.4





   98.2 98.4


 


Pulse   79 66


 


Resp   18 18


 


B/P (MAP)   106/64 (78) 110/65 (80)


 


Pulse Ox 96 96 95 93


 


O2 Delivery Room Air Room Air Room Air Room Air


 


O2 Flow Rate 10.0 10.0  


 


    





    





 10/1/19   





 07:46   


 


Resp 18   














Intake and Output   


 


 9/30/19 9/30/19 10/1/19





 15:00 23:00 07:00


 


Intake Total 1150 ml 350 ml 


 


Balance 1150 ml 350 ml 

















JELENA ISRAEL MD         Oct 1, 2019 08:39

## 2019-10-02 VITALS — SYSTOLIC BLOOD PRESSURE: 132 MMHG | DIASTOLIC BLOOD PRESSURE: 69 MMHG

## 2019-10-02 VITALS — SYSTOLIC BLOOD PRESSURE: 111 MMHG | DIASTOLIC BLOOD PRESSURE: 57 MMHG

## 2019-10-02 VITALS — SYSTOLIC BLOOD PRESSURE: 131 MMHG | DIASTOLIC BLOOD PRESSURE: 83 MMHG

## 2019-10-02 VITALS — DIASTOLIC BLOOD PRESSURE: 74 MMHG | SYSTOLIC BLOOD PRESSURE: 123 MMHG

## 2019-10-02 VITALS — DIASTOLIC BLOOD PRESSURE: 69 MMHG | SYSTOLIC BLOOD PRESSURE: 116 MMHG

## 2019-10-02 VITALS — SYSTOLIC BLOOD PRESSURE: 127 MMHG | DIASTOLIC BLOOD PRESSURE: 83 MMHG

## 2019-10-02 LAB
ANION GAP SERPL CALC-SCNC: 9 MMOL/L (ref 6–14)
BUN SERPL-MCNC: 16 MG/DL (ref 8–26)
CALCIUM SERPL-MCNC: 9.2 MG/DL (ref 8.5–10.1)
CHLORIDE SERPL-SCNC: 105 MMOL/L (ref 98–107)
CO2 SERPL-SCNC: 27 MMOL/L (ref 21–32)
CREAT SERPL-MCNC: 1.1 MG/DL (ref 0.7–1.3)
ERYTHROCYTE [DISTWIDTH] IN BLOOD BY AUTOMATED COUNT: 13.6 % (ref 11.5–14.5)
GFR SERPLBLD BASED ON 1.73 SQ M-ARVRAT: 72.1 ML/MIN
GLUCOSE SERPL-MCNC: 98 MG/DL (ref 70–99)
HCT VFR BLD CALC: 38.3 % (ref 39–53)
HGB BLD-MCNC: 13 G/DL (ref 13–17.5)
MCH RBC QN AUTO: 30 PG (ref 25–35)
MCHC RBC AUTO-ENTMCNC: 34 G/DL (ref 31–37)
MCV RBC AUTO: 88 FL (ref 79–100)
PLATELET # BLD AUTO: 247 X10^3/UL (ref 140–400)
POTASSIUM SERPL-SCNC: 4 MMOL/L (ref 3.5–5.1)
PROTHROMBIN TIME: 12.9 SEC (ref 11.7–14)
RBC # BLD AUTO: 4.33 X10^6/UL (ref 4.3–5.7)
SODIUM SERPL-SCNC: 141 MMOL/L (ref 136–145)
WBC # BLD AUTO: 5.3 X10^3/UL (ref 4–11)

## 2019-10-02 RX ADMIN — HYDROCODONE BITARTRATE AND ACETAMINOPHEN PRN TAB: 5; 325 TABLET ORAL at 10:01

## 2019-10-02 RX ADMIN — PSYLLIUM HUSK SCH PKT: 3.4 POWDER ORAL at 21:00

## 2019-10-02 RX ADMIN — VANCOMYCIN HYDROCHLORIDE PRN EACH: 1 INJECTION, POWDER, LYOPHILIZED, FOR SOLUTION INTRAVENOUS at 00:06

## 2019-10-02 RX ADMIN — POLYETHYLENE GLYCOL 3350 SCH GM: 17 POWDER, FOR SOLUTION ORAL at 07:22

## 2019-10-02 RX ADMIN — VANCOMYCIN HYDROCHLORIDE SCH MLS/HR: 1 INJECTION, POWDER, FOR SOLUTION INTRAVENOUS at 00:18

## 2019-10-02 RX ADMIN — PIPERACILLIN SODIUM AND TAZOBACTAM SODIUM SCH MLS/HR: 3; .375 INJECTION, POWDER, LYOPHILIZED, FOR SOLUTION INTRAVENOUS at 05:30

## 2019-10-02 RX ADMIN — HYDROCODONE BITARTRATE AND ACETAMINOPHEN PRN TAB: 5; 325 TABLET ORAL at 16:20

## 2019-10-02 RX ADMIN — HYDROCODONE BITARTRATE AND ACETAMINOPHEN PRN TAB: 5; 325 TABLET ORAL at 22:49

## 2019-10-02 RX ADMIN — VANCOMYCIN HYDROCHLORIDE SCH MLS/HR: 1 INJECTION, POWDER, FOR SOLUTION INTRAVENOUS at 08:59

## 2019-10-02 RX ADMIN — PIPERACILLIN SODIUM AND TAZOBACTAM SODIUM SCH MLS/HR: 3; .375 INJECTION, POWDER, LYOPHILIZED, FOR SOLUTION INTRAVENOUS at 13:07

## 2019-10-02 RX ADMIN — VANCOMYCIN HYDROCHLORIDE SCH MLS/HR: 1 INJECTION, POWDER, FOR SOLUTION INTRAVENOUS at 16:23

## 2019-10-02 RX ADMIN — VANCOMYCIN HYDROCHLORIDE SCH MLS/HR: 1 INJECTION, POWDER, FOR SOLUTION INTRAVENOUS at 23:57

## 2019-10-02 RX ADMIN — Medication SCH CAP: at 08:59

## 2019-10-02 RX ADMIN — Medication SCH CAP: at 22:49

## 2019-10-02 RX ADMIN — VANCOMYCIN HYDROCHLORIDE PRN EACH: 1 INJECTION, POWDER, LYOPHILIZED, FOR SOLUTION INTRAVENOUS at 15:01

## 2019-10-02 RX ADMIN — PIPERACILLIN SODIUM AND TAZOBACTAM SODIUM SCH MLS/HR: 3; .375 INJECTION, POWDER, LYOPHILIZED, FOR SOLUTION INTRAVENOUS at 18:12

## 2019-10-02 NOTE — NUR
Pharmacy Vancomycin Dosing Note



S:Consulted to monitor and dose vancomycin started 09/29/19.



O:LLOYD MCCRACKEN is a 48 year old M with 

Cellulitis SEPTIC ARTHRITIS .



Height: 5 feet, 7 inches

Weight: 89.278134 kg

Ideal Body Weight: 66.10 

Adjusted Body Weight: 75.94 

Dosing Weight: Actual



Other Antibiotics: 

ZOSYN 3.375G IV Q6HRS



LABS:

Last BUN: 16 

Last Creatinine: 1 

Creatinine Clearance: 98 mL/min

Last WBC: 9.8 

Last Procalcitonin: - 

Tmax (past 24 hours): 98.4 



Microbiology: 

9/30 9/29 BCX NGTD



I/O: 1500/2 voids 



Drug Levels:

Last Trough level: 17  on 10/01/19 at 2330 

Last dose given 10/01/19 at 1550 



Vancomycin Dosing:

Loading Dose: 2000 mg x1

Dosing Weight: Actual

Target Trough: 15-20





A: Based on: TROUGH





P: 1. Continue Vancomycin 1500 mg IV q8h

   2. Follow up Trough level IF NEEDED

   3. Pharmacy will continue to monitor, follow and adjust therapy as needed.

 

 FRED GONZALEZ RPH, 10/02/19 0007

-------------------------------------------------------------------------------

Signed:    10/02/19 at 0007 by FRED GONZALEZ RPH PHA

-------------------------------------------------------------------------------

## 2019-10-02 NOTE — PDOC
Infectious Disease Note


Subjective


Subjective


Doing alright


No F/C/N/V/D





ROS


ROS


per HPI





Vital Sign


Vital Signs





Vital Signs








  Date Time  Temp Pulse Resp B/P (MAP) Pulse Ox O2 Delivery O2 Flow Rate FiO2


 


10/2/19 11:20 98.4 62 18 116/69 (85) 98 Room Air  





 98.4       


 


10/2/19 11:11       10.0 











Physical Exam


PHYSICAL EXAM


GENERAL: Propped up in bed, alert, smiling 


HEENT:  Pupils are equally round and reactive.  Oropharynx pink and moist.


NECK:  Supple.


LUNGS:  Clear to auscultation.


CARDIAC:  S1 and S2.


ABDOMEN:  Soft and nontender.


EXTREMITIES:  Post-op dressing right hand dry, + wiggles fingers, warm, less 

swollen. 


SKIN:  Warm to touch.  No signs of rash.


NEUROLOGIC:  Alert, answering questions appropriately.


LUE-midline (10/1)





Right wrist and hand - no gross erythema/warmth trace edema - 2 sutures clean - 

NVI





Labs


Lab





Laboratory Tests








Test


 10/1/19


23:30 10/2/19


04:30


 


Vancomycin Level Trough


 17.0 mcg/mL


(10.0-20.0) 





 


Vancomycin Last Dose Date [id number]  


 


Vancomycin Last Dose Time 1600  


 


White Blood Count


 


 5.3 x10^3/uL


(4.0-11.0)


 


Red Blood Count


 


 4.33 x10^6/uL


(4.30-5.70)


 


Hemoglobin


 


 13.0 g/dL


(13.0-17.5)


 


Hematocrit


 


 38.3 %


(39.0-53.0)


 


Mean Corpuscular Volume  88 fL () 


 


Mean Corpuscular Hemoglobin  30 pg (25-35) 


 


Mean Corpuscular Hemoglobin


Concent 


 34 g/dL


(31-37)


 


Red Cell Distribution Width


 


 13.6 %


(11.5-14.5)


 


Platelet Count


 


 247 x10^3/uL


(140-400)


 


Prothrombin Time


 


 12.9 SEC


(11.7-14.0)


 


Prothromb Time International


Ratio 


 1.0 (0.8-1.1) 





 


Sodium Level


 


 141 mmol/L


(136-145)


 


Potassium Level


 


 4.0 mmol/L


(3.5-5.1)


 


Chloride Level


 


 105 mmol/L


()


 


Carbon Dioxide Level


 


 27 mmol/L


(21-32)


 


Anion Gap  9 (6-14) 


 


Blood Urea Nitrogen


 


 16 mg/dL


(8-26)


 


Creatinine


 


 1.1 mg/dL


(0.7-1.3)


 


Estimated GFR


(Cockcroft-Gault) 


 72.1 





 


Glucose Level


 


 98 mg/dL


(70-99)


 


Calcium Level


 


 9.2 mg/dL


(8.5-10.1)








Micro





Microbiology


9/29/19 Blood Culture - Preliminary, Resulted


          NO GROWTH AFTER 3 DAY











9/29. wrist


  GRAM STAIN RES 1  Final  


        Comment





        Moderate amount of white blood cells.





  GRAM STAIN RES 2  Final  


        No organisms seen








  ANAEROBIC RES 1  


                                PENDING





 





  AEROBIC RES 1  Final  


        Comment





        No growth in 36 - 48 hours.


        No growth in 56 - 72 hours.





Objective


Assessment


Cellulitis of right hand/septic wrist - cuture neg so far 


   - s/p joint aspiration: cloudy, WBC > 6000, 9/29 


   - s/p wrist arthrotomy, I and D, 9/29  


   - Failed OP cipro 


Puncture wound (nail) right hand.


Prediabetes


HLD





Plan


Plan of Care


Tetanus shot 9/27


Continue vancomycin and Zosyn - will decide 10/03 on outpatient meds for 

discharge


Case management for outpatient IV abx


Monitor renal function closely 


Trough 8.4 


f/u cultures





D/w Dr. Sewell 





D/w family





D/w nursing





Attending Co-Sign


Attending Co-Sign


The patient was seen and interviewed as well as examined at the bedside. The c

purvis was reviewed. The case was discussed. Agree with the plan of care.











MIHAELA WALTER         Oct 2, 2019 12:27


WADE ROMAN MD               Oct 2, 2019 16:49

## 2019-10-02 NOTE — PDOC
PROGRESS NOTES


Chief Complaint


Chief Complaint


Right wrist cellulitis and abscess, septic arthritis - s/p joint aspiration with

WBC > 6K on arthrotomy and I&D on 9/29/2019, failed outpatient therapy on oral 

antibiotics (cipro)


Obesity


Prediabetes


HLD


Anemia





History of Present Illness


History of Present Illness


Mr Sloan is a 48-year-old  male who on 09/27 was working at home when a 

wooden board, with a nail sticking out, fell, puncturing his right hand.  He 

says he wrapped his hand to stop the bleeding.  A few hours later, he arrived to

the ER with complaints of pain and swelling.  X-ray at the time showed no acute 

fracture, dislocation or radiopaque foreign body.  He was given a tetanus shot, 

prescription for Cipro and released home. 


Over the following 2 days, he says his hand became increasingly painful, swollen

with limited range of motion despite antibiotics.  He is now on vancomycin. Seen

by ID and Orthopedic surgery in consultation. now s/p Right wrist arthrotomy 

irrigation debridement septic wrist joint on 9/29/19.





He is tolerating PO pain medications well, still on IV antibiotics. No numbness 

or tingling in hand, it is slightly swollen. No diarrhea, no SOB or CP. He is 

asking to discharge soon.





Plan:


Will d/w ID antibiotic regimen - now with no growth on cultures, based on his 

septic joint and outpatient failure we will need final cultures prior to d/c. 

This seems definitive enough to me. Will try to arrange outpatient antibiotic IV


Added bowel regimen for opioid induced constipation





Vitals


Vitals





Vital Signs








  Date Time  Temp Pulse Resp B/P (MAP) Pulse Ox O2 Delivery O2 Flow Rate FiO2


 


10/2/19 07:15 98.2 79 18 132/69 (90) 97 Room Air  





 98.2       











Physical Exam


Physical Exam


GENERAL: Propped up in bed, alert, smiling 


HEENT:  Pupils are equally round and reactive.  Oropharynx pink and moist.


NECK:  Supple.


LUNGS:  Clear to auscultation.


CARDIAC:  S1 and S2.


ABDOMEN:  Soft and nontender.


EXTREMITIES:  Post-op dressing right hand dry, + wiggles fingers, warm, less 

swollen. 


SKIN:  Warm to touch.  No signs of rash.


NEUROLOGIC:  Alert, answering questions appropriately.


PIV


General:  Alert, Oriented X3, Cooperative, No acute distress


Heart:  Regular rate, Normal S1, Normal S2


Lungs:  Clear


Abdomen:  Normal bowel sounds, Soft, No tenderness, No hepatosplenomegaly, No 

masses


Extremities:  Other (RT hand markedly swoelln, cant nake a fist, tenderness and 

warm up to rt forarm near elbow, punctired dorsal site hand wound visible, no 

bleeding today)





Labs


LABS





Laboratory Tests








Test


 10/1/19


23:30 10/2/19


04:30


 


Vancomycin Level Trough


 17.0 mcg/mL


(10.0-20.0) 





 


Vancomycin Last Dose Date 10012019  


 


Vancomycin Last Dose Time 1600  


 


White Blood Count


 


 5.3 x10^3/uL


(4.0-11.0)


 


Red Blood Count


 


 4.33 x10^6/uL


(4.30-5.70)


 


Hemoglobin


 


 13.0 g/dL


(13.0-17.5)


 


Hematocrit


 


 38.3 %


(39.0-53.0)


 


Mean Corpuscular Volume  88 fL () 


 


Mean Corpuscular Hemoglobin  30 pg (25-35) 


 


Mean Corpuscular Hemoglobin


Concent 


 34 g/dL


(31-37)


 


Red Cell Distribution Width


 


 13.6 %


(11.5-14.5)


 


Platelet Count


 


 247 x10^3/uL


(140-400)


 


Prothrombin Time


 


 12.9 SEC


(11.7-14.0)


 


Prothromb Time International


Ratio 


 1.0 (0.8-1.1) 





 


Sodium Level


 


 141 mmol/L


(136-145)


 


Potassium Level


 


 4.0 mmol/L


(3.5-5.1)


 


Chloride Level


 


 105 mmol/L


()


 


Carbon Dioxide Level


 


 27 mmol/L


(21-32)


 


Anion Gap  9 (6-14) 


 


Blood Urea Nitrogen


 


 16 mg/dL


(8-26)


 


Creatinine


 


 1.1 mg/dL


(0.7-1.3)


 


Estimated GFR


(Cockcroft-Gault) 


 72.1 





 


Glucose Level


 


 98 mg/dL


(70-99)


 


Calcium Level


 


 9.2 mg/dL


(8.5-10.1)











Assessment and Plan


Assessmemt and Plan


Problems


Medical Problems:


(1) Cellulitis of right hand


Status: Acute  











Comment


Review of Relevant


I have reviewed the following items tunde (where applicable) has been applied.


Labs





Laboratory Tests








Test


 9/30/19


23:05 10/1/19


03:14 10/1/19


23:30 10/2/19


04:30


 


Vancomycin Level Trough


 8.4 mcg/mL


(10.0-20.0) 


 17.0 mcg/mL


(10.0-20.0) 





 


Vancomycin Last Dose Date 9/30/19 10012019  


 


Vancomycin Last Dose Time 1130   1600  


 


Prothrombin Time


 


 12.9 SEC


(11.7-14.0) 


 12.9 SEC


(11.7-14.0)


 


Prothromb Time International


Ratio 


 1.0 (0.8-1.1) 


 


 1.0 (0.8-1.1) 





 


White Blood Count


 


 


 


 5.3 x10^3/uL


(4.0-11.0)


 


Red Blood Count


 


 


 


 4.33 x10^6/uL


(4.30-5.70)


 


Hemoglobin


 


 


 


 13.0 g/dL


(13.0-17.5)


 


Hematocrit


 


 


 


 38.3 %


(39.0-53.0)


 


Mean Corpuscular Volume    88 fL () 


 


Mean Corpuscular Hemoglobin    30 pg (25-35) 


 


Mean Corpuscular Hemoglobin


Concent 


 


 


 34 g/dL


(31-37)


 


Red Cell Distribution Width


 


 


 


 13.6 %


(11.5-14.5)


 


Platelet Count


 


 


 


 247 x10^3/uL


(140-400)


 


Sodium Level


 


 


 


 141 mmol/L


(136-145)


 


Potassium Level


 


 


 


 4.0 mmol/L


(3.5-5.1)


 


Chloride Level


 


 


 


 105 mmol/L


()


 


Carbon Dioxide Level


 


 


 


 27 mmol/L


(21-32)


 


Anion Gap    9 (6-14) 


 


Blood Urea Nitrogen


 


 


 


 16 mg/dL


(8-26)


 


Creatinine


 


 


 


 1.1 mg/dL


(0.7-1.3)


 


Estimated GFR


(Cockcroft-Gault) 


 


 


 72.1 





 


Glucose Level


 


 


 


 98 mg/dL


(70-99)


 


Calcium Level


 


 


 


 9.2 mg/dL


(8.5-10.1)








Laboratory Tests








Test


 10/1/19


23:30 10/2/19


04:30


 


Vancomycin Level Trough


 17.0 mcg/mL


(10.0-20.0) 





 


Vancomycin Last Dose Date 10012019  


 


Vancomycin Last Dose Time 1600  


 


White Blood Count


 


 5.3 x10^3/uL


(4.0-11.0)


 


Red Blood Count


 


 4.33 x10^6/uL


(4.30-5.70)


 


Hemoglobin


 


 13.0 g/dL


(13.0-17.5)


 


Hematocrit


 


 38.3 %


(39.0-53.0)


 


Mean Corpuscular Volume  88 fL () 


 


Mean Corpuscular Hemoglobin  30 pg (25-35) 


 


Mean Corpuscular Hemoglobin


Concent 


 34 g/dL


(31-37)


 


Red Cell Distribution Width


 


 13.6 %


(11.5-14.5)


 


Platelet Count


 


 247 x10^3/uL


(140-400)


 


Prothrombin Time


 


 12.9 SEC


(11.7-14.0)


 


Prothromb Time International


Ratio 


 1.0 (0.8-1.1) 





 


Sodium Level


 


 141 mmol/L


(136-145)


 


Potassium Level


 


 4.0 mmol/L


(3.5-5.1)


 


Chloride Level


 


 105 mmol/L


()


 


Carbon Dioxide Level


 


 27 mmol/L


(21-32)


 


Anion Gap  9 (6-14) 


 


Blood Urea Nitrogen


 


 16 mg/dL


(8-26)


 


Creatinine


 


 1.1 mg/dL


(0.7-1.3)


 


Estimated GFR


(Cockcroft-Gault) 


 72.1 





 


Glucose Level


 


 98 mg/dL


(70-99)


 


Calcium Level


 


 9.2 mg/dL


(8.5-10.1)








Microbiology


9/29/19 Anaerobic/Aerobic Culture, Resulted


          Pending


9/29/19 Anaerobic Culture Result 1 (LUDIVINA), Resulted


          Pending


9/29/19 Aerobic Culture - Preliminary, Resulted


          


9/29/19 Aerobic Culture Result 1 (LUDIVINA) - Preliminary, Resulted


          


9/29/19 Gram Stain - Final, Resulted


          


9/29/19 Gram Stain Result 1 (LUDIVINA) - Final, Resulted


          


9/29/19 Gram Stain Result 2 (LUDIVINA) - Final, Resulted


          


9/29/19 Blood Culture - Preliminary, Resulted


          NO GROWTH AFTER 3 DAYS


Medications





Current Medications


Fentanyl Citrate (Fentanyl 2ml Vial) 25 mcg PRN Q15MIN  PRN IV PAIN GREATER THAN

3/10 Last administered on 9/29/19at 10:15;  Start 9/29/19 at 07:15;  Stop 

9/30/19 at 07:14;  Status DC


Vancomycin HCl 250 ml @  250 mls/hr 1X  ONCE IV  Last administered on 9/29/19at 

08:00;  Start 9/29/19 at 07:15;  Stop 9/29/19 at 08:14;  Status DC


Acetaminophen/ Hydrocodone Bitart (Lortab 5/325) 1 tab PRN Q6HRS  PRN PO 

MODERATE-SEVERE PAIN Last administered on 10/1/19at 13:59;  Start 9/29/19 at 

08:45


Morphine Sulfate (Morphine Sulfate) 2 mg PRN Q2HR  PRN IV PAIN;  Start 9/29/19 

at 08:45;  Stop 9/30/19 at 10:11;  Status DC


Acetaminophen (Tylenol) 500 mg PRN Q6HRS  PRN PO MILD PAIN / TEMP Last 

administered on 10/1/19at 21:08;  Start 9/29/19 at 08:45


Ondansetron HCl (Zofran) 4 mg PRN Q6HRS  PRN IVP NAUSEA/VOMITING;  Start 9/29/19

at 08:45


Vancomycin HCl (Vanco Per Pharmacy) 1 each PRN DAILY  PRN MC SEE COMMENTS Last 

administered on 10/2/19at 00:06;  Start 9/29/19 at 08:45


Clonidine HCl (Catapres) 0.1 mg PRN Q1HR  PRN PO HYPERTENSION;  Start 9/29/19 at

08:45


Vancomycin HCl 250 ml @  250 mls/hr 1X  ONCE IV  Last administered on 9/29/19at 

11:21;  Start 9/29/19 at 09:00;  Stop 9/29/19 at 09:59;  Status DC


Ondansetron HCl (Zofran) 4 mg PRN Q8HRS  PRN IV NAUSEA/VOMITING;  Start 9/29/19 

at 09:00;  Stop 9/30/19 at 08:59;  Status DC


Morphine Sulfate (Morphine Sulfate) 4 mg PRN Q2HR  PRN IV PAIN;  Start 9/29/19 

at 09:00;  Stop 9/30/19 at 08:59;  Status DC


Acetaminophen (Tylenol) 650 mg PRN Q4HRS  PRN PO FEVER;  Start 9/29/19 at 09:00;

 Stop 9/30/19 at 08:59;  Status DC


Sodium Chloride 1,000 ml @  100 mls/hr 1X  ONCE IV  Last administered on 

9/29/19at 14:05;  Start 9/29/19 at 09:45;  Stop 9/29/19 at 19:44;  Status DC


Propofol 20 ml @ As Directed STK-MED ONCE IV ;  Start 9/29/19 at 10:34;  Stop 

9/29/19 at 10:34;  Status DC


Lidocaine HCl (Lidocaine Pf 2% Vial) 5 ml STK-MED ONCE .ROUTE ;  Start 9/29/19 

at 10:34;  Stop 9/29/19 at 10:34;  Status DC


Fentanyl Citrate (Fentanyl 2ml Vial) 100 mcg STK-MED ONCE .ROUTE ;  Start 

9/29/19 at 10:34;  Stop 9/29/19 at 10:34;  Status DC


Fentanyl Citrate (Fentanyl 2ml Vial) 25 mcg PRN Q5MIN  PRN IV MILD PAIN 1-3;  

Start 9/29/19 at 10:45;  Stop 9/30/19 at 10:44;  Status DC


Fentanyl Citrate (Fentanyl 2ml Vial) 50 mcg PRN Q5MIN  PRN IV MODERATE TO SEVERE

PAIN;  Start 9/29/19 at 10:45;  Stop 9/30/19 at 10:44;  Status DC


Morphine Sulfate (Morphine Sulfate) 1 mg PRN Q10MIN  PRN IV SEVERE PAIN 7-10;  

Start 9/29/19 at 10:45;  Stop 9/30/19 at 10:11;  Status DC


Ringer's Solution 1,000 ml @  30 mls/hr Q24H IV  Last administered on 9/29/19at 

11:24;  Start 9/29/19 at 10:35;  Stop 9/29/19 at 22:34;  Status DC


Lidocaine HCl (Xylocaine-Mpf 1% 2ml Vial) 2 ml PRN 1X  PRN ID PRIOR TO IV START;

 Start 9/29/19 at 10:45;  Stop 9/30/19 at 10:44;  Status DC


Hydromorphone HCl (Dilaudid) 0.5 mg PRN Q10MIN  PRN IV SEV PAIN, Second choice; 

Start 9/29/19 at 10:45;  Stop 9/30/19 at 10:44;  Status DC


Prochlorperazine Edisylate (Compazine) 5 mg PACU PRN  PRN IV NAUSEA, MRX1;  

Start 9/29/19 at 10:45;  Stop 9/30/19 at 10:44;  Status DC


Piperacillin Sod/ Tazobactam Sod 3.375 gm/Sodium Chloride 50 ml @  100 mls/hr 

Q6HRS IV  Last administered on 10/2/19at 05:30;  Start 9/29/19 at 12:00


Ondansetron HCl (Zofran) 4 mg STK-MED ONCE .ROUTE ;  Start 9/29/19 at 11:37;  

Stop 9/29/19 at 11:38;  Status DC


Dexamethasone Sodium Phosphate (Decadron) 4 mg STK-MED ONCE .ROUTE ;  Start 

9/29/19 at 11:37;  Stop 9/29/19 at 11:38;  Status DC


Sevoflurane (Ultane) 15 ml STK-MED ONCE IH ;  Start 9/29/19 at 11:37;  Stop 

9/29/19 at 11:38;  Status DC


Vancomycin HCl 1.5 gm/Sodium Chloride 500 ml @  250 mls/hr Q12H IV  Last 

administered on 9/30/19at 10:47;  Start 9/29/19 at 23:30;  Stop 9/30/19 at 

23:50;  Status DC


Vancomycin HCl (Vancomycin Trough Level) 1 each 1X  ONCE MC  Last administered 

on 9/30/19at 23:00;  Start 9/30/19 at 23:00;  Stop 9/30/19 at 23:01;  Status DC


Polyethylene Glycol (miraLAX PACKET) 17 gm DAILY PO  Last administered on 

10/1/19at 07:47;  Start 9/30/19 at 11:00


Psyllium Hydrophilic Mucilloid (Metamucil Fiber Packet) 1 pkt QHS PO  Last 

administered on 9/30/19at 21:18;  Start 9/30/19 at 21:00


Lactobacillus Rhamnosus (Culturelle) 1 cap BID PO  Last administered on 

10/1/19at 21:07;  Start 9/30/19 at 21:00


Vancomycin HCl 1.5 gm/Sodium Chloride 500 ml @  250 mls/hr Q8H IV  Last 

administered on 10/2/19at 00:18;  Start 10/1/19 at 00:00


Vancomycin HCl (Vancomycin Trough Level) 1 each 1X  ONCE MC  Last administered 

on 10/1/19at 23:30;  Start 10/1/19 at 23:30;  Stop 10/1/19 at 23:31;  Status DC





Active Scripts


Active


Norco 5-325 Tablet (Acetaminophen/Hydrocodone Bitart) 1 Each Tablet 1 Tab PO PRN

Q6HRS PRN


Cipro (Ciprofloxacin Hcl) 250 Mg Tablet 1 Tab PO BID


Azithromycin Tablet (Azithromycin) 250 Mg Tablet 1 Pkg PO UD


Vitals/I & O





Vital Sign - Last 24 Hours








 10/1/19 10/1/19 10/1/19 10/1/19





 08:46 11:00 13:59 14:59


 


Temp  97.7  





  97.7  


 


Pulse  68  


 


Resp 18 18 18 18


 


B/P (MAP)  117/69 (85)  


 


Pulse Ox  95  


 


O2 Delivery Room Air Room Air Room Air 


 


    





    





 10/1/19 10/1/19 10/1/19 10/1/19





 15:00 16:45 19:00 19:00


 


Temp 98.1 97.6 98.4 





 98.1 97.6 98.4 


 


Pulse 70  68 


 


Resp 18  18 


 


B/P (MAP) 126/75 (92)  116/74 (88) 


 


Pulse Ox 94  95 


 


O2 Delivery Room Air  Room Air Room Air


 


    





    





 10/1/19 10/2/19 10/2/19 





 23:00 03:00 07:15 


 


Temp 98.9 97.9 98.2 





 98.9 97.9 98.2 


 


Pulse 70 68 79 


 


Resp 18 18 18 


 


B/P (MAP) 129/73 (91) 111/57 (75) 132/69 (90) 


 


Pulse Ox 95 95 97 


 


O2 Delivery Room Air Room Air Room Air 














Intake and Output   


 


 10/1/19 10/1/19 10/2/19





 15:00 23:00 07:00


 


Intake Total 850 ml 550 ml 960 ml


 


Balance 850 ml 550 ml 960 ml

















JELENA ISRAEL MD         Oct 2, 2019 08:07

## 2019-10-02 NOTE — NUR
BRANDY consulted for OP IV ABX needs. SW faxed referral to OP and left a VM to Pharmacy. Pt will 
need IV Rocephin and there are no programs for the drug. Admin notified for approval.

## 2019-10-03 VITALS — SYSTOLIC BLOOD PRESSURE: 113 MMHG | DIASTOLIC BLOOD PRESSURE: 74 MMHG

## 2019-10-03 VITALS — SYSTOLIC BLOOD PRESSURE: 130 MMHG | DIASTOLIC BLOOD PRESSURE: 89 MMHG

## 2019-10-03 VITALS — SYSTOLIC BLOOD PRESSURE: 132 MMHG | DIASTOLIC BLOOD PRESSURE: 72 MMHG

## 2019-10-03 VITALS — DIASTOLIC BLOOD PRESSURE: 76 MMHG | SYSTOLIC BLOOD PRESSURE: 129 MMHG

## 2019-10-03 LAB
CREAT SERPL-MCNC: 1.1 MG/DL (ref 0.7–1.3)
GFR SERPLBLD BASED ON 1.73 SQ M-ARVRAT: 72.1 ML/MIN

## 2019-10-03 RX ADMIN — VANCOMYCIN HYDROCHLORIDE PRN EACH: 1 INJECTION, POWDER, LYOPHILIZED, FOR SOLUTION INTRAVENOUS at 12:12

## 2019-10-03 RX ADMIN — POLYETHYLENE GLYCOL 3350 SCH GM: 17 POWDER, FOR SOLUTION ORAL at 08:13

## 2019-10-03 RX ADMIN — PIPERACILLIN SODIUM AND TAZOBACTAM SODIUM SCH MLS/HR: 3; .375 INJECTION, POWDER, LYOPHILIZED, FOR SOLUTION INTRAVENOUS at 12:21

## 2019-10-03 RX ADMIN — PIPERACILLIN SODIUM AND TAZOBACTAM SODIUM SCH MLS/HR: 3; .375 INJECTION, POWDER, LYOPHILIZED, FOR SOLUTION INTRAVENOUS at 02:04

## 2019-10-03 RX ADMIN — HYDROCODONE BITARTRATE AND ACETAMINOPHEN PRN TAB: 5; 325 TABLET ORAL at 12:21

## 2019-10-03 RX ADMIN — VANCOMYCIN HYDROCHLORIDE SCH MLS/HR: 1 INJECTION, POWDER, FOR SOLUTION INTRAVENOUS at 08:05

## 2019-10-03 RX ADMIN — PIPERACILLIN SODIUM AND TAZOBACTAM SODIUM SCH MLS/HR: 3; .375 INJECTION, POWDER, LYOPHILIZED, FOR SOLUTION INTRAVENOUS at 05:24

## 2019-10-03 RX ADMIN — Medication SCH CAP: at 08:04

## 2019-10-03 NOTE — NUR
Discharge Note:



VERONICA MCCRACKEN [facility]



Discharge instructions and discharge home medications reviewed with Patient and a copy 
given. All questions have been answered and understanding verbalized. 



The following instructions and handouts were given: Cellulitis



Discontinued lines and drains:  intact. Midline in place to LUE



Patient discharged to Home or Self Care with Spouse via Ambulated walked off unit by FLORA

## 2019-10-03 NOTE — PDOC
PROGRESS NOTES


Chief Complaint


Chief Complaint


Right wrist cellulitis and abscess, septic arthritis - s/p joint aspiration with

WBC > 6K on arthrotomy and I&D on 9/29/2019, failed outpatient therapy on oral 

antibiotics (cipro)


Obesity


Prediabetes


HLD


Anemia





History of Present Illness


History of Present Illness


Mr Sloan is a 48-year-old  male who on 09/27 was working at home when a 

wooden board, with a nail sticking out, fell, puncturing his right hand.  He 

says he wrapped his hand to stop the bleeding.  A few hours later, he arrived to

the ER with complaints of pain and swelling.  X-ray at the time showed no acute 

fracture, dislocation or radiopaque foreign body.  He was given a tetanus shot, 

prescription for Cipro and released home. 


Over the following 2 days, he says his hand became increasingly painful, swollen

with limited range of motion despite antibiotics.  He is now on vancomycin. Seen

by ID and Orthopedic surgery in consultation. now s/p Right wrist arthrotomy 

irrigation debridement septic wrist joint on 9/29/19.





He is tolerating PO pain medications well, still on IV antibiotics. No numbness 

or tingling in hand, it is slightly swollen. No diarrhea, no SOB or CP. He is 

asking to discharge soon.





Plan:


Will d/w ID antibiotic regimen - now with no growth on cultures, based on his 

septic joint and outpatient failure we will need final cultures prior to d/c. 

This seems definitive enough to me. Will give dapto per ID recommendations


Added bowel regimen for opioid induced constipation





Vitals


Vitals





Vital Signs








  Date Time  Temp Pulse Resp B/P (MAP) Pulse Ox O2 Delivery O2 Flow Rate FiO2


 


10/3/19 07:26 97.9 57 18 130/89 (103) 97 Room Air  





 97.9       


 


10/3/19 03:00       10.0 











Physical Exam


Physical Exam


GENERAL: Propped up in bed, alert, smiling 


HEENT:  Pupils are equally round and reactive.  Oropharynx pink and moist.


NECK:  Supple.


LUNGS:  Clear to auscultation.


CARDIAC:  S1 and S2.


ABDOMEN:  Soft and nontender.


EXTREMITIES:  Post-op dressing right hand dry, + wiggles fingers, warm, less 

swollen. 


SKIN:  Warm to touch.  No signs of rash.


NEUROLOGIC:  Alert, answering questions appropriately.


LUE-midline (10/1)





Right wrist and hand - no gross erythema/warmth trace edema - 2 sutures clean - 

NVI


General:  Alert, Oriented X3, Cooperative, No acute distress


Heart:  Regular rate, Normal S1, Normal S2


Lungs:  Clear


Abdomen:  Normal bowel sounds, Soft, No tenderness, No hepatosplenomegaly, No 

masses


Extremities:  Other (RT hand markedly swoelln, cant nake a fist, tenderness and 

warm up to rt forarm near elbow, punctired dorsal site hand wound visible, no 

bleeding today)





Labs


LABS





Laboratory Tests








Test


 10/3/19


05:20


 


Creatinine


 1.1 mg/dL


(0.7-1.3)


 


Estimated GFR


(Cockcroft-Gault) 72.1 














Assessment and Plan


Assessmemt and Plan


Problems


Medical Problems:


(1) Cellulitis of right hand


Status: Acute  











Comment


Review of Relevant


I have reviewed the following items tunde (where applicable) has been applied.


Labs





Laboratory Tests








Test


 10/1/19


23:30 10/2/19


04:30 10/3/19


05:20


 


Vancomycin Level Trough


 17.0 mcg/mL


(10.0-20.0) 


 





 


Vancomycin Last Dose Date 10012019   


 


Vancomycin Last Dose Time 1600   


 


White Blood Count


 


 5.3 x10^3/uL


(4.0-11.0) 





 


Red Blood Count


 


 4.33 x10^6/uL


(4.30-5.70) 





 


Hemoglobin


 


 13.0 g/dL


(13.0-17.5) 





 


Hematocrit


 


 38.3 %


(39.0-53.0) 





 


Mean Corpuscular Volume  88 fL ()  


 


Mean Corpuscular Hemoglobin  30 pg (25-35)  


 


Mean Corpuscular Hemoglobin


Concent 


 34 g/dL


(31-37) 





 


Red Cell Distribution Width


 


 13.6 %


(11.5-14.5) 





 


Platelet Count


 


 247 x10^3/uL


(140-400) 





 


Prothrombin Time


 


 12.9 SEC


(11.7-14.0) 





 


Prothromb Time International


Ratio 


 1.0 (0.8-1.1) 


 





 


Sodium Level


 


 141 mmol/L


(136-145) 





 


Potassium Level


 


 4.0 mmol/L


(3.5-5.1) 





 


Chloride Level


 


 105 mmol/L


() 





 


Carbon Dioxide Level


 


 27 mmol/L


(21-32) 





 


Anion Gap  9 (6-14)  


 


Blood Urea Nitrogen


 


 16 mg/dL


(8-26) 





 


Creatinine


 


 1.1 mg/dL


(0.7-1.3) 1.1 mg/dL


(0.7-1.3)


 


Estimated GFR


(Cockcroft-Gault) 


 72.1 


 72.1 





 


Glucose Level


 


 98 mg/dL


(70-99) 





 


Calcium Level


 


 9.2 mg/dL


(8.5-10.1) 











Laboratory Tests








Test


 10/3/19


05:20


 


Creatinine


 1.1 mg/dL


(0.7-1.3)


 


Estimated GFR


(Cockcroft-Gault) 72.1 











Microbiology


9/29/19 Anaerobic/Aerobic Culture - Preliminary, Resulted


          


9/29/19 Anaerobic Culture Result 1 (LUDIVINA) - Preliminary, Resulted


          


9/29/19 Aerobic Culture - Final, Resulted


          


9/29/19 Aerobic Culture Result 1 (LUDIVINA) - Final, Resulted


          


9/29/19 Gram Stain - Final, Resulted


          


9/29/19 Gram Stain Result 1 (LUDIVINA) - Final, Resulted


          


9/29/19 Gram Stain Result 2 (LUDIVINA) - Final, Resulted


          


9/29/19 Blood Culture - Preliminary, Resulted


          NO GROWTH AFTER 4 DAYS


Medications





Current Medications


Fentanyl Citrate (Fentanyl 2ml Vial) 25 mcg PRN Q15MIN  PRN IV PAIN GREATER THAN

3/10 Last administered on 9/29/19at 10:15;  Start 9/29/19 at 07:15;  Stop 

9/30/19 at 07:14;  Status DC


Vancomycin HCl 250 ml @  250 mls/hr 1X  ONCE IV  Last administered on 9/29/19at 

08:00;  Start 9/29/19 at 07:15;  Stop 9/29/19 at 08:14;  Status DC


Acetaminophen/ Hydrocodone Bitart (Lortab 5/325) 1 tab PRN Q6HRS  PRN PO 

MODERATE-SEVERE PAIN Last administered on 10/2/19at 22:49;  Start 9/29/19 at 

08:45


Morphine Sulfate (Morphine Sulfate) 2 mg PRN Q2HR  PRN IV PAIN;  Start 9/29/19 

at 08:45;  Stop 9/30/19 at 10:11;  Status DC


Acetaminophen (Tylenol) 500 mg PRN Q6HRS  PRN PO MILD PAIN / TEMP Last 

administered on 10/1/19at 21:08;  Start 9/29/19 at 08:45


Ondansetron HCl (Zofran) 4 mg PRN Q6HRS  PRN IVP NAUSEA/VOMITING;  Start 9/29/19

at 08:45


Vancomycin HCl (Vanco Per Pharmacy) 1 each PRN DAILY  PRN MC SEE COMMENTS Last 

administered on 10/2/19at 15:01;  Start 9/29/19 at 08:45


Clonidine HCl (Catapres) 0.1 mg PRN Q1HR  PRN PO HYPERTENSION;  Start 9/29/19 at

08:45


Vancomycin HCl 250 ml @  250 mls/hr 1X  ONCE IV  Last administered on 9/29/19at 

11:21;  Start 9/29/19 at 09:00;  Stop 9/29/19 at 09:59;  Status DC


Ondansetron HCl (Zofran) 4 mg PRN Q8HRS  PRN IV NAUSEA/VOMITING;  Start 9/29/19 

at 09:00;  Stop 9/30/19 at 08:59;  Status DC


Morphine Sulfate (Morphine Sulfate) 4 mg PRN Q2HR  PRN IV PAIN;  Start 9/29/19 

at 09:00;  Stop 9/30/19 at 08:59;  Status DC


Acetaminophen (Tylenol) 650 mg PRN Q4HRS  PRN PO FEVER;  Start 9/29/19 at 09:00;

 Stop 9/30/19 at 08:59;  Status DC


Sodium Chloride 1,000 ml @  100 mls/hr 1X  ONCE IV  Last administered on 

9/29/19at 14:05;  Start 9/29/19 at 09:45;  Stop 9/29/19 at 19:44;  Status DC


Propofol 20 ml @ As Directed STK-MED ONCE IV ;  Start 9/29/19 at 10:34;  Stop 

9/29/19 at 10:34;  Status DC


Lidocaine HCl (Lidocaine Pf 2% Vial) 5 ml STK-MED ONCE .ROUTE ;  Start 9/29/19 

at 10:34;  Stop 9/29/19 at 10:34;  Status DC


Fentanyl Citrate (Fentanyl 2ml Vial) 100 mcg STK-MED ONCE .ROUTE ;  Start 

9/29/19 at 10:34;  Stop 9/29/19 at 10:34;  Status DC


Fentanyl Citrate (Fentanyl 2ml Vial) 25 mcg PRN Q5MIN  PRN IV MILD PAIN 1-3;  

Start 9/29/19 at 10:45;  Stop 9/30/19 at 10:44;  Status DC


Fentanyl Citrate (Fentanyl 2ml Vial) 50 mcg PRN Q5MIN  PRN IV MODERATE TO SEVERE

PAIN;  Start 9/29/19 at 10:45;  Stop 9/30/19 at 10:44;  Status DC


Morphine Sulfate (Morphine Sulfate) 1 mg PRN Q10MIN  PRN IV SEVERE PAIN 7-10;  

Start 9/29/19 at 10:45;  Stop 9/30/19 at 10:11;  Status DC


Ringer's Solution 1,000 ml @  30 mls/hr Q24H IV  Last administered on 9/29/19at 

11:24;  Start 9/29/19 at 10:35;  Stop 9/29/19 at 22:34;  Status DC


Lidocaine HCl (Xylocaine-Mpf 1% 2ml Vial) 2 ml PRN 1X  PRN ID PRIOR TO IV START;

 Start 9/29/19 at 10:45;  Stop 9/30/19 at 10:44;  Status DC


Hydromorphone HCl (Dilaudid) 0.5 mg PRN Q10MIN  PRN IV SEV PAIN, Second choice; 

Start 9/29/19 at 10:45;  Stop 9/30/19 at 10:44;  Status DC


Prochlorperazine Edisylate (Compazine) 5 mg PACU PRN  PRN IV NAUSEA, MRX1;  

Start 9/29/19 at 10:45;  Stop 9/30/19 at 10:44;  Status DC


Piperacillin Sod/ Tazobactam Sod 3.375 gm/Sodium Chloride 50 ml @  100 mls/hr 

Q6HRS IV  Last administered on 10/3/19at 05:24;  Start 9/29/19 at 12:00


Ondansetron HCl (Zofran) 4 mg STK-MED ONCE .ROUTE ;  Start 9/29/19 at 11:37;  

Stop 9/29/19 at 11:38;  Status DC


Dexamethasone Sodium Phosphate (Decadron) 4 mg STK-MED ONCE .ROUTE ;  Start 

9/29/19 at 11:37;  Stop 9/29/19 at 11:38;  Status DC


Sevoflurane (Ultane) 15 ml STK-MED ONCE IH ;  Start 9/29/19 at 11:37;  Stop 

9/29/19 at 11:38;  Status DC


Vancomycin HCl 1.5 gm/Sodium Chloride 500 ml @  250 mls/hr Q12H IV  Last 

administered on 9/30/19at 10:47;  Start 9/29/19 at 23:30;  Stop 9/30/19 at 

23:50;  Status DC


Vancomycin HCl (Vancomycin Trough Level) 1 each 1X  ONCE MC  Last administered 

on 9/30/19at 23:00;  Start 9/30/19 at 23:00;  Stop 9/30/19 at 23:01;  Status DC


Polyethylene Glycol (miraLAX PACKET) 17 gm DAILY PO  Last administered on 

10/1/19at 07:47;  Start 9/30/19 at 11:00


Psyllium Hydrophilic Mucilloid (Metamucil Fiber Packet) 1 pkt QHS PO  Last 

administered on 9/30/19at 21:18;  Start 9/30/19 at 21:00


Lactobacillus Rhamnosus (Culturelle) 1 cap BID PO  Last administered on 

10/3/19at 08:04;  Start 9/30/19 at 21:00


Vancomycin HCl 1.5 gm/Sodium Chloride 500 ml @  250 mls/hr Q8H IV  Last 

administered on 10/3/19at 08:05;  Start 10/1/19 at 00:00


Vancomycin HCl (Vancomycin Trough Level) 1 each 1X  ONCE MC  Last administered 

on 10/1/19at 23:30;  Start 10/1/19 at 23:30;  Stop 10/1/19 at 23:31;  Status DC





Active Scripts


Active


Norco 5-325 Tablet (Acetaminophen/Hydrocodone Bitart) 1 Each Tablet 1 Tab PO PRN

Q6HRS PRN


Cipro (Ciprofloxacin Hcl) 250 Mg Tablet 1 Tab PO BID


Azithromycin Tablet (Azithromycin) 250 Mg Tablet 1 Pkg PO UD


Vitals/I & O





Vital Sign - Last 24 Hours








 10/2/19 10/2/19 10/2/19 10/2/19





 11:11 11:20 15:08 16:20


 


Temp  98.4 98.0 





  98.4 98.0 


 


Pulse  62 60 


 


Resp  18 18 


 


B/P (MAP)  116/69 (85) 123/74 (90) 


 


Pulse Ox 97 98 97 97


 


O2 Delivery Room Air Room Air Room Air Room Air


 


O2 Flow Rate 10.0   10.0


 


    





    





 10/2/19 10/2/19 10/2/19 10/2/19





 19:00 20:05 22:49 22:51


 


Temp 97.9   98.0





 97.9   98.0


 


Pulse 65   63


 


Resp 15   18


 


B/P (MAP) 127/83 (98)   131/83 (99)


 


Pulse Ox 95   95


 


O2 Delivery Room Air Room Air Room Air Room Air


 


O2 Flow Rate 10.0   10.0


 


    





    





 10/2/19 10/3/19 10/3/19 





 23:49 03:00 07:26 


 


Temp  98.2 97.9 





  98.2 97.9 


 


Pulse  54 57 


 


Resp  18 18 


 


B/P (MAP)  113/74 (87) 130/89 (103) 


 


Pulse Ox  97 97 


 


O2 Delivery Room Air Room Air Room Air 


 


O2 Flow Rate  10.0  














Intake and Output   


 


 10/2/19 10/2/19 10/3/19





 15:00 23:00 07:00


 


Intake Total 720 ml 280 ml 350 ml


 


Balance 720 ml 280 ml 350 ml

















JELENA ISRAEL MD         Oct 3, 2019 08:42

## 2019-10-03 NOTE — NUR
SW arranged OP IV Dapto with OP clinic. Pt is scheduled to come at 1000 tomorrow. Admin and 
Pharmacy notified. D/w ID and Physician. Pt agreeable with plans.

## 2019-10-03 NOTE — PDOC3
Discharge Summary


Visit Information


Date of Admission:  Sep 29, 2019


Date of Discharge:  Oct 3, 2019


Admitting Diagnosis:  Cellulitis and abscess of right hand


Final Diagnosis


Problems


Medical Problems:


(1) Cellulitis of right hand


Status: Acute  











Brief Hospital Course


Allergies





                                    Allergies








Coded Allergies Type Severity Reaction Last Updated Verified


 


  No Known Drug Allergies    12/28/18 No








Vital Signs





Vital Signs








  Date Time  Temp Pulse Resp B/P (MAP) Pulse Ox O2 Delivery O2 Flow Rate FiO2


 


10/3/19 14:59 97.1 61 20 129/76 (93) 97 Room Air  





 97.1       


 


10/3/19 13:37       10.0 








Lab Results





Laboratory Tests








Test


 10/1/19


23:30 10/2/19


04:30 10/3/19


05:20


 


Vancomycin Level Trough


 17.0 mcg/mL


(10.0-20.0) 


 





 


Vancomycin Last Dose Date 83152187   


 


Vancomycin Last Dose Time 1600   


 


White Blood Count


 


 5.3 x10^3/uL


(4.0-11.0) 





 


Red Blood Count


 


 4.33 x10^6/uL


(4.30-5.70) 





 


Hemoglobin


 


 13.0 g/dL


(13.0-17.5) 





 


Hematocrit


 


 38.3 %


(39.0-53.0) 





 


Mean Corpuscular Volume  88 fL ()  


 


Mean Corpuscular Hemoglobin  30 pg (25-35)  


 


Mean Corpuscular Hemoglobin


Concent 


 34 g/dL


(31-37) 





 


Red Cell Distribution Width


 


 13.6 %


(11.5-14.5) 





 


Platelet Count


 


 247 x10^3/uL


(140-400) 





 


Prothrombin Time


 


 12.9 SEC


(11.7-14.0) 





 


Prothromb Time International


Ratio 


 1.0 (0.8-1.1) 


 





 


Sodium Level


 


 141 mmol/L


(136-145) 





 


Potassium Level


 


 4.0 mmol/L


(3.5-5.1) 





 


Chloride Level


 


 105 mmol/L


() 





 


Carbon Dioxide Level


 


 27 mmol/L


(21-32) 





 


Anion Gap  9 (6-14)  


 


Blood Urea Nitrogen


 


 16 mg/dL


(8-26) 





 


Creatinine


 


 1.1 mg/dL


(0.7-1.3) 1.1 mg/dL


(0.7-1.3)


 


Estimated GFR


(Cockcroft-Gault) 


 72.1 


 72.1 





 


Glucose Level


 


 98 mg/dL


(70-99) 





 


Calcium Level


 


 9.2 mg/dL


(8.5-10.1) 











Laboratory Tests








Test


 10/3/19


05:20


 


Creatinine


 1.1 mg/dL


(0.7-1.3)


 


Estimated GFR


(Cockcroft-Gault) 72.1 











Brief Hospital Course


Mr Sloan is a 48-year-old  male who on 09/27 was working at home when a 

wooden board, with a nail sticking out, fell, puncturing his right hand.  He 

says he wrapped his hand to stop the bleeding.  A few hours later, he arrived to

the ER with complaints of pain and swelling.  X-ray at the time showed no acute 

fracture, dislocation or radiopaque foreign body.  He was given a tetanus shot, 

prescription for Cipro and released home. 


Over the following 2 days, he says his hand became increasingly painful, swollen

with limited range of motion despite antibiotics.  He is now on vancomycin. Seen

by ID and Orthopedic surgery in consultation. now s/p Right wrist arthrotomy 

irrigation debridement septic wrist joint on 9/29/19.





He is tolerating PO pain medications well, still on IV antibiotics. No numbness 

or tingling in hand, it is slightly swollen. No diarrhea, no SOB or CP. He is 

transitioning to daptomycin for the next 16 days and has CBC, ESR, CRP, CMP and 

ID f/u and ortho f/u.





Assessment: 


Right wrist cellulitis and abscess, septic arthritis - s/p joint aspiration with

WBC > 6K on arthrotomy and I&D on 9/29/2019, failed outpatient therapy on oral 

antibiotics (cipro)


Obesity


Prediabetes


HLD


Anemia








Plan:


Will d/w ID antibiotic regimen - now with no growth on cultures, based on his 

septic joint and outpatient failure we will need final cultures prior to d/c. 

This seems definitive enough to me. Will give dapto per ID recommendations


Added bowel regimen for opioid induced constipation





Greater than 30 minutes spent on d/c





Discharge Information


Condition at Discharge:  Improved


Follow Up:  Weeks (2)


Disposition/Orders:  D/C to Home


Scheduled PRN


Hydrocodone/Apap 5-325 (Norco 5-325 Tablet) 1 Each Tablet, 1 TAB PO PRN Q6HRS 

PRN for PAIN, #10 Ref 0


   Prescribed by: HODAN TIRADO MD on 9/27/19 1258


   Last Action: Continued on 9/29/19 9664 by DEE MATAMOROS





Discontinued Medications


Azithromycin (Azithromycin Tablet) 250 Mg Tablet, 1 PKG PO UD, #6


   Prescribed by: FRANCISCA MORATAYA on 12/28/18 1546


   Last Action: HELD on 9/29/19 0844 by DEE MATAMOROS


Ciprofloxacin Hcl (Cipro) 250 Mg Tablet, 1 TAB PO BID for infection, #14


   Prescribed by: HODAN TIRADO MD on 9/27/19 2259


   Last Action: HELD on 9/29/19 0844 by JELENA KNIGHT MD         Oct 3, 2019 16:21

## 2019-10-03 NOTE — PDOC
Infectious Disease Note


Subjective


Subjective


Comfortable


Hand feels better


Swelling going down, improved ROM - less pain


No F/C/N/V





ROS


ROS


per HPI





Vital Sign


Vital Signs





Vital Signs








  Date Time  Temp Pulse Resp B/P (MAP) Pulse Ox O2 Delivery O2 Flow Rate FiO2


 


10/3/19 07:26 97.9 57 18 130/89 (103) 97 Room Air  





 97.9       


 


10/3/19 03:00       10.0 











Physical Exam


PHYSICAL EXAM


GENERAL: Propped up in bed, alert, smiling 


HEENT:  Pupils are equally round and reactive.  Oropharynx pink and moist.


NECK:  Supple.


LUNGS:  Clear to auscultation.


CARDIAC:  S1 and S2.


ABDOMEN:  Soft and nontender.


EXTREMITIES:  Right hand bandaged, improved ROM wrist, NVI 


SKIN:  Warm to touch.  No signs of rash.


NEUROLOGIC:  Alert, answering questions appropriately.


LUE-midline (10/1)





Labs


Lab





Laboratory Tests








Test


 10/3/19


05:20


 


Creatinine


 1.1 mg/dL


(0.7-1.3)


 


Estimated GFR


(Cockcroft-Gault) 72.1 











Micro





Microbiology


9/29/19 Blood Culture - Preliminary, Resulted


          NO GROWTH AFTER 4 DAY











9/29. wrist


  GRAM STAIN RES 1  Final  


        Comment





        Moderate amount of white blood cells.





  GRAM STAIN RES 2  Final  


        No organisms seen





  ANAEROBIC RES 1  Preliminary  


        Comment





        No anaerobes recovered in 48 hours.





  AEROBIC CULT  Final  


        Preliminary report


        Final report





  AEROBIC RES 1  Final  


        Comment





        No growth in 36 - 48 hours.


        No growth in 56 - 72 hours.





Objective


Assessment


Cellulitis of right hand/septic wrist - cuture neg 


   - s/p joint aspiration: cloudy, WBC > 6000, 9/29 


   - s/p wrist arthrotomy, I and D, 9/29  


   - Failed OP cipro 


Puncture wound (nail) right hand.


Prediabetes


HLD





Plan


Plan of Care


Tetanus shot 9/27


Continue vancomycin and Zosyn for now, further rec to follow for outpatient 


Case management for outpatient IV abx


Monitor renal function closely 


Trough 17.0 





D/w Dr. Sewell 





D/w family at bedside 





D/c Vanc and zosyn Dose Daptomycin for 16 days


Rx written


Labs Q Monday CBC/sed rate/CPK/Cr fax to [phone]


F/u 2 weeks ID office





Attending Co-Sign


Attending Co-Sign


The patient was seen and interviewed as well as examined at the bedside. The 

chart was reviewed. The case was discussed. Agree with the plan of care.











MIHAELA WLATER         Oct 3, 2019 11:00


WADE ROMAN MD               Oct 3, 2019 14:15

## 2019-10-23 ENCOUNTER — HOSPITAL ENCOUNTER (EMERGENCY)
Dept: HOSPITAL 61 - ER | Age: 49
Discharge: HOME | End: 2019-10-23
Payer: COMMERCIAL

## 2019-10-23 VITALS — HEIGHT: 67 IN | WEIGHT: 198 LBS | BODY MASS INDEX: 31.08 KG/M2

## 2019-10-23 VITALS — SYSTOLIC BLOOD PRESSURE: 118 MMHG | DIASTOLIC BLOOD PRESSURE: 74 MMHG

## 2019-10-23 DIAGNOSIS — L03.113: Primary | ICD-10-CM

## 2019-10-23 LAB
ALBUMIN SERPL-MCNC: 4.1 G/DL (ref 3.4–5)
ALBUMIN/GLOB SERPL: 1.1 {RATIO} (ref 1–1.7)
ALP SERPL-CCNC: 60 U/L (ref 46–116)
ALT SERPL-CCNC: 48 U/L (ref 16–63)
ANION GAP SERPL CALC-SCNC: 10 MMOL/L (ref 6–14)
AST SERPL-CCNC: 27 U/L (ref 15–37)
BASOPHILS # BLD AUTO: 0 X10^3/UL (ref 0–0.2)
BASOPHILS NFR BLD: 1 % (ref 0–3)
BILIRUB SERPL-MCNC: 0.3 MG/DL (ref 0.2–1)
BUN SERPL-MCNC: 26 MG/DL (ref 8–26)
BUN/CREAT SERPL: 26 (ref 6–20)
CALCIUM SERPL-MCNC: 9.3 MG/DL (ref 8.5–10.1)
CHLORIDE SERPL-SCNC: 105 MMOL/L (ref 98–107)
CO2 SERPL-SCNC: 29 MMOL/L (ref 21–32)
CREAT SERPL-MCNC: 1 MG/DL (ref 0.7–1.3)
EOSINOPHIL NFR BLD: 0.1 X10^3/UL (ref 0–0.7)
EOSINOPHIL NFR BLD: 2 % (ref 0–3)
ERYTHROCYTE [DISTWIDTH] IN BLOOD BY AUTOMATED COUNT: 13 % (ref 11.5–14.5)
GFR SERPLBLD BASED ON 1.73 SQ M-ARVRAT: 80.4 ML/MIN
GLOBULIN SER-MCNC: 3.9 G/DL (ref 2.2–3.8)
GLUCOSE SERPL-MCNC: 88 MG/DL (ref 70–99)
HCT VFR BLD CALC: 41.4 % (ref 39–53)
HGB BLD-MCNC: 13.9 G/DL (ref 13–17.5)
LYMPHOCYTES # BLD: 1.7 X10^3/UL (ref 1–4.8)
LYMPHOCYTES NFR BLD AUTO: 32 % (ref 24–48)
MCH RBC QN AUTO: 29 PG (ref 25–35)
MCHC RBC AUTO-ENTMCNC: 34 G/DL (ref 31–37)
MCV RBC AUTO: 87 FL (ref 79–100)
MONO #: 0.4 X10^3/UL (ref 0–1.1)
MONOCYTES NFR BLD: 7 % (ref 0–9)
NEUT #: 3.1 X10^3/UL (ref 1.8–7.7)
NEUTROPHILS NFR BLD AUTO: 58 % (ref 31–73)
PLATELET # BLD AUTO: 278 X10^3/UL (ref 140–400)
POTASSIUM SERPL-SCNC: 3.9 MMOL/L (ref 3.5–5.1)
PROT SERPL-MCNC: 8 G/DL (ref 6.4–8.2)
RBC # BLD AUTO: 4.73 X10^6/UL (ref 4.3–5.7)
SODIUM SERPL-SCNC: 144 MMOL/L (ref 136–145)
WBC # BLD AUTO: 5.4 X10^3/UL (ref 4–11)

## 2019-10-23 PROCEDURE — 83605 ASSAY OF LACTIC ACID: CPT

## 2019-10-23 PROCEDURE — 85025 COMPLETE CBC W/AUTO DIFF WBC: CPT

## 2019-10-23 PROCEDURE — 36415 COLL VENOUS BLD VENIPUNCTURE: CPT

## 2019-10-23 PROCEDURE — 96365 THER/PROPH/DIAG IV INF INIT: CPT

## 2019-10-23 PROCEDURE — 85651 RBC SED RATE NONAUTOMATED: CPT

## 2019-10-23 PROCEDURE — 80053 COMPREHEN METABOLIC PANEL: CPT

## 2019-10-23 PROCEDURE — 99285 EMERGENCY DEPT VISIT HI MDM: CPT

## 2019-10-23 PROCEDURE — 73130 X-RAY EXAM OF HAND: CPT

## 2019-10-23 NOTE — RAD
3 views of right hand without comparison for right hand pain, swelling, 

previous injury.

 

FINDINGS: There is no evidence of acute fracture or dislocation. Mild 

deformity of the head of the first metacarpal is stable, and there relate 

to distant injury. No unexpected radiopaque foreign bodies are seen. No 

significant degenerative changes are evident.

 

IMPRESSION:

No acute findings.

 

Electronically signed by: Lucho Joseph MD (10/23/2019 12:04 PM) [facility]

## 2019-10-23 NOTE — PHYS DOC
Past Medical History


Past Medical History:  No Pertinent History


Past Surgical History:  No Surgical History


Alcohol Use:  None


Drug Use:  None





Adult General


Chief Complaint


Chief Complaint:  HAND PROBLEM





HPI


HPI





Patient is a 48  year old male presents with right hand pain and swelling this 

been ongoing since September 27 after a nail went through his hand. The patient 

was seen on October 3, admitted to the hospital and had a surgery to wash out 

the hand and then was seen by infectious disease and put on a 16 day course of 

daptomycin. The patient states he was seen on Friday, October 19 and everything 

looked good at that time. He states since the 19th his hand has started to swell

again and become hot to the touch.





Review of Systems


Review of Systems





Constitutional: Denies fever or chills []


Eyes: Denies change in visual acuity, redness, or eye pain []


HENT: Denies nasal congestion or sore throat []


Respiratory: Denies cough or shortness of breath []


Cardiovascular: No additional information not addressed in HPI []


GI: Denies abdominal pain, nausea, vomiting, bloody stools or diarrhea []


: Denies dysuria or hematuria []


Musculoskeletal: Reports R hand pain and edema.


Integument: Denies rash or skin lesions []


Neurologic: Denies headache, focal weakness or sensory changes []


Endocrine: Denies polyuria or polydipsia []





Complete systems were reviewed and found to be within normal limits, except as 

documented in this note.





Current Medications


Current Medications





Current Medications








 Medications


  (Trade)  Dose


 Ordered  Sig/Alton  Start Time


 Stop Time Status Last Admin


Dose Admin


 


 Clindamycin


 Phosphate  50 ml @ 


 100 mls/hr  1X  ONCE  10/23/19 12:00


 10/23/19 12:29 DC 10/23/19 12:31


100 MLS/HR











Allergies


Allergies





Allergies








Coded Allergies Type Severity Reaction Last Updated Verified


 


  No Known Drug Allergies    10/19/19 No











Physical Exam


Physical Exam





Constitutional: Well developed, well nourished, no acute distress, non-toxic 

appearance. []


HENT: Normocephalic, atraumatic, bilateral external ears normal, oropharynx 

moist, no oral exudates, nose normal. []


Eyes: PERRLA, EOMI, conjunctiva normal, no discharge. [] 


Neck: Normal range of motion, no tenderness, supple, no stridor. [] 


Cardiovascular:Heart rate regular rhythm, no murmur []


Lungs & Thorax:  Bilateral breath sounds clear to auscultation []


Abdomen: Bowel sounds normal, soft, no tenderness, no masses, no pulsatile 

masses. [] 


Skin: R hand is hot to touch, and has edema. 


Back: No tenderness, no CVA tenderness. [] 


Extremities: No tenderness, no cyanosis, no clubbing, ROM intact, no edema. [] 


Neurologic: Alert and oriented X 3, normal motor function, normal sensory 

function, no focal deficits noted. []


Psychologic: Affect normal, judgement normal, mood normal. []





Current Patient Data


Vital Signs





                                   Vital Signs








  Date Time  Temp Pulse Resp B/P (MAP) Pulse Ox O2 Delivery O2 Flow Rate FiO2


 


10/23/19 11:01 98.0 73 16 138/78 (98) 97 Room Air  





 98.0       








Lab Values





                                Laboratory Tests








Test


 10/23/19


11:26


 


White Blood Count


 5.4 x10^3/uL


(4.0-11.0)


 


Red Blood Count


 4.73 x10^6/uL


(4.30-5.70)


 


Hemoglobin


 13.9 g/dL


(13.0-17.5)


 


Hematocrit


 41.4 %


(39.0-53.0)


 


Mean Corpuscular Volume


 87 fL ()





 


Mean Corpuscular Hemoglobin 29 pg (25-35)  


 


Mean Corpuscular Hemoglobin


Concent 34 g/dL


(31-37)


 


Red Cell Distribution Width


 13.0 %


(11.5-14.5)


 


Platelet Count


 278 x10^3/uL


(140-400)


 


Neutrophils (%) (Auto) 58 % (31-73)  


 


Lymphocytes (%) (Auto) 32 % (24-48)  


 


Monocytes (%) (Auto) 7 % (0-9)  


 


Eosinophils (%) (Auto) 2 % (0-3)  


 


Basophils (%) (Auto) 1 % (0-3)  


 


Neutrophils # (Auto)


 3.1 x10^3/uL


(1.8-7.7)


 


Lymphocytes # (Auto)


 1.7 x10^3/uL


(1.0-4.8)


 


Monocytes # (Auto)


 0.4 x10^3/uL


(0.0-1.1)


 


Eosinophils # (Auto)


 0.1 x10^3/uL


(0.0-0.7)


 


Basophils # (Auto)


 0.0 x10^3/uL


(0.0-0.2)


 


Erythrocyte Sedimentation Rate 1 (0-15)  


 


Sodium Level


 144 mmol/L


(136-145)


 


Potassium Level


 3.9 mmol/L


(3.5-5.1)


 


Chloride Level


 105 mmol/L


()


 


Carbon Dioxide Level


 29 mmol/L


(21-32)


 


Anion Gap 10 (6-14)  


 


Blood Urea Nitrogen


 26 mg/dL


(8-26)


 


Creatinine


 1.0 mg/dL


(0.7-1.3)


 


Estimated GFR


(Cockcroft-Gault) 80.4  





 


BUN/Creatinine Ratio 26 (6-20)  H


 


Glucose Level


 88 mg/dL


(70-99)


 


Lactic Acid Level


 1.5 mmol/L


(0.4-2.0)


 


Calcium Level


 9.3 mg/dL


(8.5-10.1)


 


Total Bilirubin


 0.3 mg/dL


(0.2-1.0)


 


Aspartate Amino Transferase


(AST) 27 U/L (15-37)





 


Alanine Aminotransferase (ALT)


 48 U/L (16-63)





 


Alkaline Phosphatase


 60 U/L


()


 


Total Protein


 8.0 g/dL


(6.4-8.2)


 


Albumin


 4.1 g/dL


(3.4-5.0)


 


Albumin/Globulin Ratio 1.1 (1.0-1.7)  





                                Laboratory Tests


10/23/19 11:26








                                Laboratory Tests


10/23/19 11:26











EKG


EKG


[]





Radiology/Procedures


Radiology/Procedures


[]





Course & Med Decision Making


Course & Med Decision Making


Pertinent Labs and Imaging studies reviewed. (See chart for details)





Will get lab work, x-ray, and give IV antibiotics.





Labs are unremarkable. X-ray is unremarkable. Discussed with patient and he 

prefers to see ID and Ortho outpatient. Offered admission but he declined.





Dragon Disclaimer


Dragon Disclaimer


This electronic medical record was generated, in whole or in part, using a voice

 recognition dictation system.





Departure


Departure


Impression:  


   Primary Impression:  


   Cellulitis


Disposition:  01 HOME, SELF-CARE


Condition:  STABLE


Referrals:  


NO PCP (PCP)








WADE ROMAN MD, TIMOTHY J MD


Patient Instructions:  Cellulitis





Additional Instructions:  


Thank you for visiting . We appreciate you trusting us 

with your care. If any additional problems come up don't hesitate to return to 

visit us. Please follow up with your primary care provider so they can plan 

additional care if needed and know about the problem that you had. If symptoms 

worsen come back to the Emergency Department. Any concerning symptoms that start

 such as chest pain, shortness of air, weakness or numbness on one side of the 

body, running high fevers or any other concerning symptoms return to the ER.





You have been prescribed an antibiotic today to help fight your infection. 

Please take all of the antibiotic as directed. If after 48 hours the infection 

is not improving, please return for more care. If the infection worsens, return 

to ER for additional care.





Please follow up with I/D and Ortho. If gets worse please return to ER or if you

 start running a fever return to hospital.


Scripts


Clindamycin Hcl (CLINDAMYCIN HCL) 150 Mg Capsule


450 MG PO QID for 7 Days, #84 CAP


   Prov: MIKE VILLAR         10/23/19





Problem Qualifiers








   Primary Impression:  


   Cellulitis


   Site of cellulitis:  extremity  Site of cellulitis of extremity:  upper 

   extremity  Laterality:  right  Qualified Codes:  L03.113 - Cellulitis of 

   right upper limb








MIKE VILLAR          Oct 23, 2019 11:18

## 2021-11-30 ENCOUNTER — HOSPITAL ENCOUNTER (EMERGENCY)
Dept: HOSPITAL 61 - ER | Age: 51
Discharge: HOME | End: 2021-11-30
Payer: SELF-PAY

## 2021-11-30 VITALS — HEIGHT: 67 IN | WEIGHT: 202.38 LBS | BODY MASS INDEX: 31.76 KG/M2

## 2021-11-30 VITALS — SYSTOLIC BLOOD PRESSURE: 123 MMHG | DIASTOLIC BLOOD PRESSURE: 66 MMHG

## 2021-11-30 DIAGNOSIS — E11.9: ICD-10-CM

## 2021-11-30 DIAGNOSIS — E78.00: ICD-10-CM

## 2021-11-30 DIAGNOSIS — R10.9: Primary | ICD-10-CM

## 2021-11-30 LAB
ALBUMIN SERPL-MCNC: 3.8 G/DL (ref 3.4–5)
ALBUMIN/GLOB SERPL: 1.2 {RATIO} (ref 1–1.7)
ALP SERPL-CCNC: 59 U/L (ref 46–116)
ALT SERPL-CCNC: 44 U/L (ref 16–63)
ANION GAP SERPL CALC-SCNC: 7 MMOL/L (ref 6–14)
APTT PPP: YELLOW S
AST SERPL-CCNC: 23 U/L (ref 15–37)
BACTERIA #/AREA URNS HPF: 0 /HPF
BASOPHILS # BLD AUTO: 0.1 X10^3/UL (ref 0–0.2)
BASOPHILS NFR BLD: 1 % (ref 0–3)
BILIRUB SERPL-MCNC: 0.2 MG/DL (ref 0.2–1)
BILIRUB UR QL STRIP: NEGATIVE
BUN SERPL-MCNC: 20 MG/DL (ref 8–26)
BUN/CREAT SERPL: 20 (ref 6–20)
CALCIUM SERPL-MCNC: 8.5 MG/DL (ref 8.5–10.1)
CHLORIDE SERPL-SCNC: 104 MMOL/L (ref 98–107)
CO2 SERPL-SCNC: 29 MMOL/L (ref 21–32)
CREAT SERPL-MCNC: 1 MG/DL (ref 0.7–1.3)
EOSINOPHIL NFR BLD: 0.3 X10^3/UL (ref 0–0.7)
EOSINOPHIL NFR BLD: 6 % (ref 0–3)
ERYTHROCYTE [DISTWIDTH] IN BLOOD BY AUTOMATED COUNT: 13.4 % (ref 11.5–14.5)
FIBRINOGEN PPP-MCNC: CLEAR MG/DL
GFR SERPLBLD BASED ON 1.73 SQ M-ARVRAT: 79.4 ML/MIN
GLUCOSE SERPL-MCNC: 114 MG/DL (ref 70–99)
HCT VFR BLD CALC: 41.2 % (ref 39–53)
HGB BLD-MCNC: 14.1 G/DL (ref 13–17.5)
LIPASE: 69 U/L (ref 73–393)
LYMPHOCYTES # BLD: 2.6 X10^3/UL (ref 1–4.8)
LYMPHOCYTES NFR BLD AUTO: 43 % (ref 24–48)
MCH RBC QN AUTO: 30 PG (ref 25–35)
MCHC RBC AUTO-ENTMCNC: 34 G/DL (ref 31–37)
MCV RBC AUTO: 87 FL (ref 79–100)
MONO #: 0.6 X10^3/UL (ref 0–1.1)
MONOCYTES NFR BLD: 10 % (ref 0–9)
NEUT #: 2.4 X10^3/UL (ref 1.8–7.7)
NEUTROPHILS NFR BLD AUTO: 40 % (ref 31–73)
NITRITE UR QL STRIP: NEGATIVE
PH UR STRIP: 6.5 [PH]
PLATELET # BLD AUTO: 263 X10^3/UL (ref 140–400)
POTASSIUM SERPL-SCNC: 3.7 MMOL/L (ref 3.5–5.1)
PROT SERPL-MCNC: 6.9 G/DL (ref 6.4–8.2)
PROT UR STRIP-MCNC: NEGATIVE MG/DL
RBC # BLD AUTO: 4.72 X10^6/UL (ref 4.3–5.7)
RBC #/AREA URNS HPF: 0 /HPF (ref 0–2)
SODIUM SERPL-SCNC: 140 MMOL/L (ref 136–145)
UROBILINOGEN UR-MCNC: 1 MG/DL
WBC # BLD AUTO: 5.9 X10^3/UL (ref 4–11)
WBC #/AREA URNS HPF: 0 /HPF (ref 0–4)

## 2021-11-30 PROCEDURE — 81001 URINALYSIS AUTO W/SCOPE: CPT

## 2021-11-30 PROCEDURE — 83690 ASSAY OF LIPASE: CPT

## 2021-11-30 PROCEDURE — 96374 THER/PROPH/DIAG INJ IV PUSH: CPT

## 2021-11-30 PROCEDURE — 85025 COMPLETE CBC W/AUTO DIFF WBC: CPT

## 2021-11-30 PROCEDURE — 74176 CT ABD & PELVIS W/O CONTRAST: CPT

## 2021-11-30 PROCEDURE — 80053 COMPREHEN METABOLIC PANEL: CPT

## 2021-11-30 PROCEDURE — 36415 COLL VENOUS BLD VENIPUNCTURE: CPT

## 2021-11-30 PROCEDURE — 99284 EMERGENCY DEPT VISIT MOD MDM: CPT

## 2021-11-30 RX ADMIN — KETOROLAC TROMETHAMINE ONE MG: 30 INJECTION, SOLUTION INTRAMUSCULAR at 21:36

## 2021-11-30 NOTE — PHYS DOC
Past Medical History


Past Medical History:  Diabetes-Type II, High Cholesterol, Hyperthyroid


Additional Past Medical Histor:  Borderline DM


 (GRICELDA RIDDLE LAURA APRN)


Past Surgical History:  Other


Additional Past Surgical Histo:  RIGHT HAND


 (GRICELDA RIDDLE APRN)


Smoking Status:  Never Smoker


Alcohol Use:  None


Drug Use:  None


 (GRICELDA RIDDLE APRN)





General Adult


EDM:


Chief Complaint:  FLANK PAIN





HPI:


HPI:





Patient is a 51  year old male with history of high cholesterol, diabetes type 

2, who presents the ED today complaining of 8 out of 10 right flank pain, 

symptoms began 3 days ago.  Patient denies any fever, nausea, vomiting, urgency 

frequency or dysuria.  Patient states he contacted his PCP for an appointment 

but the earliest the head was Friday.


 (GRICELDA RIDDLE APRN)





Review of Systems:


Review of Systems:


Constitutional:   Denies fever or chills. []


Eyes:   Denies change in visual acuity. []


HENT:   Denies nasal congestion or sore throat. [] 


Respiratory:   Denies cough or shortness of breath. [] 


Cardiovascular:   Denies chest pain or edema. [] 


GI:   Denies abdominal pain, nausea, vomiting, bloody stools or diarrhea. [] 


: Reports right flank pain.  Denies dysuria. [] 


Musculoskeletal:   Denies back pain or joint pain. [] 


Integument:   Denies rash. [] 


Neurologic:   Denies headache, focal weakness or sensory changes. [] [] 


Psychiatric:  Denies depression or anxiety. []


 (GRICELDA RIDDLE LAURA APRN)





Heart Score:


C/O Chest Pain:  N/A


Risk Factors:


Risk Factors:  DM, Current or recent (<one month) smoker, HTN, HLP, family 

history of CAD, obesity.


Risk Scores:


Score 0 - 3:  2.5% MACE over next 6 weeks - Discharge Home


Score 4 - 6:  20.3% MACE over next 6 weeks - Admit for Clinical Observation


Score 7 - 10:  72.7% MACE over next 6 weeks - Early Invasive Strategies


 (KEATONUCHEGRICELDA LAURA APRN)





Allergies:


Allergies:





Allergies








Coded Allergies Type Severity Reaction Last Updated Verified


 


  No Known Drug Allergies    10/19/19 No








 (KEATONUCHEGRICELDA LAURA APRN)





Physical Exam:


PE:





Constitutional: Well developed, well nourished, no acute distress, non-toxic 

appearance. []


HENT: Normocephalic, atraumatic, bilateral external ears normal, oropharynx 

moist, no oral exudates, nose normal. []


Eyes: PERRLA, EOMI, conjunctiva normal, no discharge. [] 


Neck: Normal range of motion, no tenderness, supple, no stridor. [] 


Cardiovascular:Heart rate regular rhythm, no murmur []


Lungs & Thorax:  Bilateral breath sounds clear to auscultation []


Abdomen: Bowel sounds normal, soft, no tenderness, no masses, no pulsatile 

masses. [] 


Skin: Warm, dry, no erythema, no rash. [] 


Back: No tenderness, no CVA tenderness. [] 


Extremities: No tenderness, no cyanosis, no clubbing, ROM intact, no edema. [] 


Neurologic: Alert and oriented X 3, normal motor function, normal sensory 

function, no focal deficits noted. []


Psychologic: Affect normal, judgement normal, mood normal. []


 (GRICELDA RIDDLE)





Current Patient Data:


Labs:





                                Laboratory Tests








Test


 11/30/21


19:40 11/30/21


19:45


 


Urine Collection Type Unknown   


 


Urine Color Yellow   


 


Urine Clarity Clear   


 


Urine pH


 6.5 (<5.0-8.0)


 





 


Urine Specific Gravity


 1.020


(1.000-1.030) 





 


Urine Protein


 Negative mg/dL


(NEG-TRACE) 





 


Urine Glucose (UA)


 Negative mg/dL


(NEG) 





 


Urine Ketones (Stick)


 Negative mg/dL


(NEG) 





 


Urine Blood


 Negative (NEG)


 





 


Urine Nitrite


 Negative (NEG)


 





 


Urine Bilirubin


 Negative (NEG)


 





 


Urine Urobilinogen Dipstick


 1.0 mg/dL (0.2


mg/dL) 





 


Urine Leukocyte Esterase


 Negative (NEG)


 





 


Urine RBC 0 /HPF (0-2)   


 


Urine WBC 0 /HPF (0-4)   


 


Urine Bacteria


 0 /HPF (0-FEW)


 





 


White Blood Count


 


 5.9 x10^3/uL


(4.0-11.0)


 


Red Blood Count


 


 4.72 x10^6/uL


(4.30-5.70)


 


Hemoglobin


 


 14.1 g/dL


(13.0-17.5)


 


Hematocrit


 


 41.2 %


(39.0-53.0)


 


Mean Corpuscular Volume


 


 87 fL ()





 


Mean Corpuscular Hemoglobin  30 pg (25-35)  


 


Mean Corpuscular Hemoglobin


Concent 


 34 g/dL


(31-37)


 


Red Cell Distribution Width


 


 13.4 %


(11.5-14.5)


 


Platelet Count


 


 263 x10^3/uL


(140-400)


 


Neutrophils (%) (Auto)  40 % (31-73)  


 


Lymphocytes (%) (Auto)  43 % (24-48)  


 


Monocytes (%) (Auto)  10 % (0-9)  H


 


Eosinophils (%) (Auto)  6 % (0-3)  H


 


Basophils (%) (Auto)  1 % (0-3)  


 


Neutrophils # (Auto)


 


 2.4 x10^3/uL


(1.8-7.7)


 


Lymphocytes # (Auto)


 


 2.6 x10^3/uL


(1.0-4.8)


 


Monocytes # (Auto)


 


 0.6 x10^3/uL


(0.0-1.1)


 


Eosinophils # (Auto)


 


 0.3 x10^3/uL


(0.0-0.7)


 


Basophils # (Auto)


 


 0.1 x10^3/uL


(0.0-0.2)


 


Sodium Level


 


 140 mmol/L


(136-145)


 


Potassium Level


 


 3.7 mmol/L


(3.5-5.1)


 


Chloride Level


 


 104 mmol/L


()


 


Carbon Dioxide Level


 


 29 mmol/L


(21-32)


 


Anion Gap  7 (6-14)  


 


Blood Urea Nitrogen


 


 20 mg/dL


(8-26)


 


Creatinine


 


 1.0 mg/dL


(0.7-1.3)


 


Estimated GFR


(Cockcroft-Gault) 


 79.4  





 


BUN/Creatinine Ratio  20 (6-20)  


 


Glucose Level


 


 114 mg/dL


(70-99)  H


 


Calcium Level


 


 8.5 mg/dL


(8.5-10.1)


 


Total Bilirubin


 


 0.2 mg/dL


(0.2-1.0)


 


Aspartate Amino Transferase


(AST) 


 23 U/L (15-37)





 


Alanine Aminotransferase (ALT)


 


 44 U/L (16-63)





 


Alkaline Phosphatase


 


 59 U/L


()


 


Total Protein


 


 6.9 g/dL


(6.4-8.2)


 


Albumin


 


 3.8 g/dL


(3.4-5.0)


 


Albumin/Globulin Ratio  1.2 (1.0-1.7)  


 


Lipase


 


 69 U/L


()  L





                                Laboratory Tests


11/30/21 19:45








                                Laboratory Tests


11/30/21 19:45








Vital Signs:





                                   Vital Signs








  Date Time  Temp Pulse Resp B/P (MAP) Pulse Ox O2 Delivery O2 Flow Rate FiO2


 


11/30/21 19:42 97.8 69 16 137/74 (95) 97 Room Air  





 97.8       








 (GRICELDA RIDDLE APRN)





EKG:


EKG:


[]


 (GRICELDA RIDDLE APRCELESTINA)





Radiology/Procedures:


Radiology/Procedures:


[]PROCEDURE: CT ABDOMEN PELVIS WO CONTRAST





PQRS Compliance Statement:





One or more of the following individualized dose reduction techniques were 

utilized for this examination:  


1. Automated exposure control  


2. Adjustment of the mA and/or kV according to patient size  


3. Use of iterative reconstruction technique








CT ABDOMEN+PELVIS WO





Clinical Indication: Reason: flank pain / Spl. Instructions:  / History: 





Comparison: None.





Technique: Helical CT imaging of the abdomen and pelvis is performed without IV 

or oral contrast.





Findings: 


Evaluation of solid organs and bowel is limited without oral and IV contrast, 

decreasing sensitivity for detection of pathology.





The lung bases are clear. Cardiac size normal.





The liver, gallbladder, spleen, pancreas, adrenal glands, and abdominal aorta 

are normal.





There is no renal, ureteral, or bladder calculus. No perinephric stranding or 

hydronephrosis is identified.





There is no obvious abnormality the stomach. There is no dilated small bowel. 

The appendix is normal. Scattered stool in the colon. There is no colon wall 

thickening. No abdominal adenopathy or free fluid.





The urinary bladder is normal. Prostate and seminal vesicles are normal. There 

is no pelvic free fluid. Question small fat-containing left inguinal hernia.





There is a hemangioma of the T10 vertebral body. No acute bone abnormality.





IMPRESSION:


No acute abdominal or pelvic abnormality. No obstructive uropathy.





Electronically signed by: Christian Krishna MD (11/30/2021 8:55 PM) [facility]














DICTATED and SIGNED BY:     CHRISTIAN KRISHNA MD


DATE:     11/30/21 [id number] 0


 (BEEGRICELDA DOHERTY LAURA APRCELESTINA)





Course & Med Decision Making:


Course & Med Decision Making


Pertinent Labs and Imaging studies reviewed. (See chart for details)





This is a 51-year-old male patient presenting to the ED today complaining of 

right flank pain for 3 days.





UA negative for infection, CBC CMP  with no acute findings.  CT of the abdomen 

and pelvic is negative for any acute findings.  Discharged home.  Follow-up with

 PCP on Friday








 (GRICELDA RIDDLE)


Course & Med Decision Making


Patients Care and treatment plan provided by ER Nurse Practitioner.  I was 

available for consult.  Patient's chart reviewed.


 (MESERET CALDERA DO)


Dragon Disclaimer:


Dragon Disclaimer:


This electronic medical record was generated, in whole or in part, using a voice

 recognition dictation system.


 (GRICELDA RIDDLE)





Departure


Departure


Impression:  


   Primary Impression:  


   Acute flank pain


Disposition:  01 HOME / SELF CARE / HOMELESS


Condition:  STABLE


Referrals:  


NO PCP (PCP)


follow up in one week


Patient Instructions:  Flank Pain, Easy-to-Read





Additional Instructions:  


You were evaluated in the emergency room, your lab work including urine was 

negative for any acute findings.  We did a CT of your abdomen and pelvis which 

was negative for any acute findings.  Please follow-up with your primary care 

doctor on Friday.


Scripts


Gabapentin (GABAPENTIN **) 300 Mg Capsule


300 MG PO TID for NEUROGENIC PAIN, #21 CAP


   Prov: GRICELDA RIDDLE         11/30/21 


Naproxen (NAPROXEN) 375 Mg Tablet


1 TAB PO BID for pain, #10 TAB 0 Refills


   with food


   Prov: GRICELDA RIDDLE         11/30/21 


Cyclobenzaprine Hcl (CYCLOBENZAPRINE HCL) 10 Mg Tablet


1 TAB PO TID, #30 TAB


   Prov: GRICELDA RIDDLE         11/30/21











GRICELDA RIDDLE            Nov 30, 2021 21:17


MESERET CALDERA DO             Dec 1, 2021 03:46

## 2021-11-30 NOTE — RAD
PQRS Compliance Statement:



One or more of the following individualized dose reduction techniques were utilized for this examinat
ion:  

1. Automated exposure control  

2. Adjustment of the mA and/or kV according to patient size  

3. Use of iterative reconstruction technique





CT ABDOMEN+PELVIS WO



Clinical Indication: Reason: flank pain / Spl. Instructions:  / History: 



Comparison: None.



Technique: Helical CT imaging of the abdomen and pelvis is performed without IV or oral contrast.



Findings: 

Evaluation of solid organs and bowel is limited without oral and IV contrast, decreasing sensitivity 
for detection of pathology.



The lung bases are clear. Cardiac size normal.



The liver, gallbladder, spleen, pancreas, adrenal glands, and abdominal aorta are normal.



There is no renal, ureteral, or bladder calculus. No perinephric stranding or hydronephrosis is ident
ified.



There is no obvious abnormality the stomach. There is no dilated small bowel. The appendix is normal.
 Scattered stool in the colon. There is no colon wall thickening. No abdominal adenopathy or free flu
id.



The urinary bladder is normal. Prostate and seminal vesicles are normal. There is no pelvic free flui
d. Question small fat-containing left inguinal hernia.



There is a hemangioma of the T10 vertebral body. No acute bone abnormality.



IMPRESSION:

No acute abdominal or pelvic abnormality. No obstructive uropathy.



Electronically signed by: Christian Krishna MD (11/30/2021 8:55 PM) [facility]

## 2022-01-08 ENCOUNTER — HOSPITAL ENCOUNTER (EMERGENCY)
Dept: HOSPITAL 61 - ER | Age: 52
Discharge: HOME | End: 2022-01-08
Payer: SELF-PAY

## 2022-01-08 VITALS — SYSTOLIC BLOOD PRESSURE: 115 MMHG | DIASTOLIC BLOOD PRESSURE: 55 MMHG

## 2022-01-08 VITALS — HEIGHT: 68 IN | WEIGHT: 225.09 LBS | BODY MASS INDEX: 34.11 KG/M2

## 2022-01-08 DIAGNOSIS — E11.9: ICD-10-CM

## 2022-01-08 DIAGNOSIS — E78.00: ICD-10-CM

## 2022-01-08 DIAGNOSIS — U07.1: Primary | ICD-10-CM

## 2022-01-08 DIAGNOSIS — Z87.891: ICD-10-CM

## 2022-01-08 PROCEDURE — 99284 EMERGENCY DEPT VISIT MOD MDM: CPT

## 2022-01-08 NOTE — PHYS DOC
Past Medical History


Past Medical History:  Diabetes-Type II, High Cholesterol, Hyperthyroid


Additional Past Medical Histor:  "prediabetes"


Past Surgical History:  Other


Additional Past Surgical Histo:  RIGHT HAND


Smoking Status:  Former Smoker


Additional Information:  


quit smoking 20 years ago


Alcohol Use:  None


Drug Use:  None





Adult General


Chief Complaint


Chief Complaint:  SHORTNESS OF BREATH





HPI


HPI


The patient is a 51-year-old male with a history of prediabetes and who is 

otherwise healthy.  He is currently positive for COVID-19 and has had about 5 to

6 days of symptoms.  Symptoms have included nasal congestion, rhinorrhea, dry 

cough, sore throat, fatigue, malaise and body aches.  Earlier today he was 

having a lot of nasal congestion and felt like he could not get a good breath 

and because of it so elected emergency department evaluation.  Upon initial 

evaluation here in the emergency department patient is alert and pleasantly 

appropriately interactive and in no acute distress.  Vital signs including 

oxygenation are entirely appropriate here.  He is not dyspneic and is speaking 

comfortably in full sentences in a normal tone of voice.





Review of Systems


Review of Systems


A 12 point review of systems was completed and was negative except where noted 

in HPI above.





Allergies


Allergies





Allergies








Coded Allergies Type Severity Reaction Last Updated Verified


 


  No Known Drug Allergies    1/8/22 No











Physical Exam


Physical Exam


Middle-aged  male appearing nontoxic and in no acute distress.  Head is 

normocephalic and atraumatic.  Neck is supple and nontender.  Oropharynx is 

moist.  Lungs are clear to auscultation at all stations.  There is a normal S1 

and S2 without rubs or gallops and capillary refill is appropriate, less than 2 

seconds globally.  Abdomen is soft, nontender and nondistended.  Skin is warm 

and dry without cyanosis, clubbing or edema.  Psychiatrically, the patient 

demonstrates appropriate mood and affect and is alert.  Evaluation of the 

extremities reveals BUEs and BLEs neurovascularly intact distally with strength 

5 out of 5, sensation intact light touch in all nerve distributions, radial, DP 

and PT pulses 2+ equal bilaterally, capillary refill less than 2 seconds, hands 

and feet warm and well-perfused.  No dependent peripheral edema distally.  No 

calf tenderness swelling bilaterally.  Homans test is negative bilaterally.





Current Patient Data


Vital Signs





                                   Vital Signs








  Date Time  Temp Pulse Resp B/P (MAP) Pulse Ox O2 Delivery O2 Flow Rate FiO2


 


1/8/22 13:05 98.3 83 20 140/64 (89) 97 Room Air  





 98.3       











EKG


EKG


[]





Radiology/Procedures


Radiology/Procedures


[]





Course & Med Decision Making


Course & Med Decision Making


51-year-old gentleman presenting for evaluation of Covid symptoms.  Vital signs 

and clinical examination are reassuring and are compatible with safe discharge 

home.  Patient is advised to purchase a pulse oximeter to monitor his oxygen 

levels at home, and is instructed in the particulars of good supportive care.  

He is to follow-up with primary in the next 2 to 4 days and understands that if 

he feels worse instead of better or develops other new symptoms of concern that 

he will need to return to the emergency department immediately for reevaluation.

  All questions are answered.





Dragon Disclaimer


Dragon Disclaimer


This electronic medical record was generated, in whole or in part, using a voice

 recognition dictation system.





Departure


Departure


Impression:  


   Primary Impression:  


   COVID-19


Disposition:  01 HOME / SELF CARE / HOMELESS


Condition:  GOOD





Additional Instructions:  


Follow-up very closely with your primary care doctor in the office in the next 2

 to 4 days for reevaluation of your symptoms and a discussion of next best steps

 in care.  Drink plenty of fluids and get plenty of rest.  You may try DayQuil 

and NyQuil as well as ibuprofen for symptoms.  Purchase a pulse oximeter to 

monitor your oxygen levels at home as we discussed; they should be 92% or above 

consistently.  Return to the emergency department right away for worsening 

symptoms of any kind or with any other new symptoms of concern.





-------------------------





Definicin


Se le realiz la prueba de deteccin del COVID-19 o se le diagnostic dicha 

enfermedad. Es 


roney infeccin ocasionada por un nuevo tipo de coronavirus. En la mayora de los 

casos, el 


COVID-19 provoca sntomas similares a los del resfriado. En algunas personas, 

puede 


ocasionar sntomas ms graves, bita problemas respiratorios.





No existe un tratamiento para el virus COVID-19. El cuerpo elimina la infeccin 

con el 


tiempo. El cuidado personal ayuda a aliviar el malestar.





Pasos que debe seguir


1. Cuidados personales


Descanse cuando sea necesario. Los hbitos saludables pueden ayudarlo a sentirse

 mejor. 


Algunas medidas para lograr cambios incluyen lo siguiente:





 - Elija alimentos saludables, bita frutas y verduras. Jeri abundante cantidad 

de agua 


sheila todo el da.


 - Duerma emily por la noche.


 - Si fuma, intente no hacerlo. [place] ayudar a mejorar la respiracin.


 - Evite el alcohol.


2.  Mantenga sanos a los dems


El virus puede contagiarse a otras personas. Cada vez que estornuda o tose, se 

liberan 


gotitas. Las gotitas pueden entrar en la boca, la nariz o los ojos de las 

personas que se 


encuentran cerca de usted y ocasionar la infeccin. Para reducir las probab

ilidades de 


contagiar el virus COVID-19 a otros, tenga en cuenta lo siguiente:





 - Qudese en casa el tiempo que el mdico se lo indique. Es posible que deba 

quedarse en 


casa hasta que la enfermedad desaparezca. Salga nicamente para recibir atencin

 mdica o en 


louie de urgencia.


 - Evite las reas pblicas, los eventos o el transporte pblico. No reanude las

 actividades 


laborales o escolares hasta que el mdico lo autorice.


 - Llame previamente si necesita asistir a un centro mdico. Avise que es posib

le que haya 


contrado COVID-19. [place] ayudar a que le indiquen adonde debe dirigirse. 

Oscar pueden 


pedirle que use roney mscara facial cuando vaya al consultorio. Si llama a los 

servicios de 


asistencia mdica de urgencias, avseles que es posible que haya contrado 

COVID-19.





Mientras est en casa:





 -  Evite el contacto directo con otras personas. Mantngase a roney distancia ap

roximada de 2 


metros. Si es posible, pasen la mayor parte del tiempo en osborne separadas.


 - Use roney mscara facial si estar en contacto directo con otras personas, por 

ejemplo, si 


compartir roney habitacin o un vehculo.


 - Pida a alguien que limpie las superficies comunes de la casa. Limpie 

picaportes, mesadas 


y lavamanos con limpiadores domsticos todos los clay.


 - Al toser o estornudar, cbrase con un pauelo de papel. Despus de usarlo, 

deschelo de 


inmediato. Si no tiene un pauelo de papel, tosa o estornude en el pliegue del 

codo.


 - Lvese las elizabeth con frecuencia. Lvese las elizabeth despus de estornudar o 

toser. Lvese 


con agua y jabn sheila, al menos, 20 segundos. Si no dispone de agua y jabn, 

use un 


limpiador de elizabeth a base de alcohol.


 - No cocine para otros. Evite compartir objetos personales, bita tenedores, 

cucharas o 


cepillos de dientes.


 - Mientras est enfermo, evite el contacto directo con las mascotas. No hay 

indicios de si 


el virus se transmite a las mascotas. Esta es roney medida de seguridad que debe 

tenerse en 


cuenta hasta que se sepa ms acerca de tomi virus.


El aislamiento puede ser frustrante. La interaccin social puede ayudar. 

Mantngase en 


contacto con amigos y familiares por telfono u otros medios tecnolgicos. Puede

 interactuar 


con otras personas en el hogar, jordyn mantenga roeny distancia donald de 

aproximadamente 2 


metros.





Seguimiento


Las pruebas para confirmar la presencia del COVID-19 pueden demorar algunos 

clay. Es posible 


que deba seguir los pasos mencionados anteriormente hasta que estn los 

resultados de las 


pruebas. Lo llamarn del consultorio mdico para saber si ha habido algn cambio

 en bianchi 


jorge. Tambin le avisarn cuando pueda volver a estar cerca de otras personas.





Problemas a los que debe estar atento


Comunquese con el mdico si no se recupera segn lo previsto o si tiene 

problemas bita los 


siguientes:





 - Dificultad para respirar


 - Dolor de pecho


 - Empeoramiento de los sntomas


Si johnny que tiene roney urgencia, llame a los servicios de asistencia mdica de 

urgencias de 


inmediato.





As taken from [facility]











AZUCENA MONTALVO MD                 Jan 8, 2022 13:41